# Patient Record
Sex: MALE | Race: WHITE | NOT HISPANIC OR LATINO | Employment: OTHER | URBAN - METROPOLITAN AREA
[De-identification: names, ages, dates, MRNs, and addresses within clinical notes are randomized per-mention and may not be internally consistent; named-entity substitution may affect disease eponyms.]

---

## 2018-01-23 ENCOUNTER — HOSPITAL ENCOUNTER (EMERGENCY)
Facility: CLINIC | Age: 63
Discharge: HOME | End: 2018-01-23
Attending: EMERGENCY MEDICINE | Admitting: EMERGENCY MEDICINE
Payer: COMMERCIAL

## 2018-01-23 ENCOUNTER — APPOINTMENT (OUTPATIENT)
Dept: ULTRASOUND IMAGING | Facility: CLINIC | Age: 63
End: 2018-01-23
Payer: COMMERCIAL

## 2018-01-23 VITALS
WEIGHT: 232.5 LBS | DIASTOLIC BLOOD PRESSURE: 93 MMHG | OXYGEN SATURATION: 98 % | HEART RATE: 61 BPM | RESPIRATION RATE: 16 BRPM | SYSTOLIC BLOOD PRESSURE: 161 MMHG | TEMPERATURE: 98.3 F

## 2018-01-23 DIAGNOSIS — M79.662 PAIN OF LEFT LOWER LEG: ICD-10-CM

## 2018-01-23 PROCEDURE — 99283 EMERGENCY DEPT VISIT LOW MDM: CPT | Mod: Z6 | Performed by: EMERGENCY MEDICINE

## 2018-01-23 PROCEDURE — 93971 EXTREMITY STUDY: CPT | Mod: LT

## 2018-01-23 PROCEDURE — 99284 EMERGENCY DEPT VISIT MOD MDM: CPT | Mod: 25

## 2018-01-23 RX ORDER — ATORVASTATIN CALCIUM 20 MG/1
20 TABLET, FILM COATED ORAL DAILY
COMMUNITY

## 2018-01-23 RX ORDER — LISINOPRIL 20 MG/1
20 TABLET ORAL DAILY
COMMUNITY

## 2018-01-23 ASSESSMENT — ENCOUNTER SYMPTOMS
PSYCHIATRIC NEGATIVE: 1
SHORTNESS OF BREATH: 0
MYALGIAS: 1
CHILLS: 0
NEUROLOGICAL NEGATIVE: 1
ARTHRALGIAS: 1
FEVER: 0
COLOR CHANGE: 0
WOUND: 0

## 2018-01-23 NOTE — DISCHARGE INSTRUCTIONS
Please make an appointment to follow up with Your Orthopedic Surgeon as soon as possible unless symptoms completely resolve.

## 2018-01-23 NOTE — ED NOTES
Pt initially noted a soft lump in the left popliteal fossa ~2 weeks ago. Now today he has also developed a firm mass in this same area.   He tells me that ~ 2 months ago he was performing some yard work and had some knee pads strapped on tightly on his knees. He thinks that perhaps this might have contributed to his current issues.  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  He denies any long car or air travel.  Pt denies any known injury to his left knee.  Current medications are antihypertensive and a statin.

## 2018-01-23 NOTE — ED NOTES
"Patient discovered a \"lump\" behind left knee this morning, reports he has had muscle pain to lateral left lower leg since changing his shoes to boots this winter.  Patient reports he was referred by his clinic to rule out a blood clot.  "

## 2018-01-23 NOTE — ED PROVIDER NOTES
"  History     Chief Complaint   Patient presents with     Leg Pain     Patient reports \"lump\" to left leg behind knee; called clinic and was referred to ED to rule out blood clot.  Patient denies recent travel.     HPI  Gio Jean is a 62 year old male who presents with chief complaint of a lump and pain behind his left knee.  Patient reports having had an aching pain in his left thigh and left lower calf for about 2 months.  Patient reports starting yesterday he noticed a big lump on the superior portion of his calf, and called his primary care physician who recommended he come to the emergency department to rule out a blood clot.  Patient has noted that he has felt a little bit of a lump in this leg before but it has never been this big.  Patient denies recent travel, recent surgery or any personal or family history of blood clots or bleeding disorders.  Patient denies chest pain or shortness of breath.  He denies swelling or erythema of the leg.    I have reviewed the Medications, Allergies, Past Medical and Surgical History, and Social History in the Epic system.    Review of Systems   Constitutional: Negative for chills and fever.   Respiratory: Negative for shortness of breath.    Cardiovascular: Negative for chest pain.   Musculoskeletal: Positive for arthralgias and myalgias.   Skin: Negative for color change, rash and wound.   Neurological: Negative.    Psychiatric/Behavioral: Negative.        Physical Exam   BP: (!) 161/93  Pulse: 61  Temp: 98.3  F (36.8  C)  Resp: 16  Weight: 105.5 kg (232 lb 8 oz)  SpO2: 98 %      Physical Exam   Constitutional: No distress.   HENT:   Head: Atraumatic.   Mouth/Throat: Oropharynx is clear and moist. No oropharyngeal exudate.   Eyes: Pupils are equal, round, and reactive to light. No scleral icterus.   Cardiovascular: Normal heart sounds and intact distal pulses.    Pulmonary/Chest: Breath sounds normal. No respiratory distress.   Abdominal: Soft. Bowel sounds are " normal. There is no tenderness.   Musculoskeletal: He exhibits no edema or tenderness.   [id number] lump left popliteal fossa, no lower extremity edema, no erythema   Skin: Skin is warm. No rash noted. He is not diaphoretic.       ED Course     ED Course     Procedures             Critical Care time:  none         Results for orders placed or performed during the hospital encounter of 01/23/18   US Lower Extremity Venous Duplex Left    Narrative    ULTRASOUND LEFT LOWER EXTREMITY VENOUS DUPLEX  1/23/2018 3:37 PM     HISTORY: Left leg swelling. Rule out DVT and Baker's cyst. Posterior  knee mass and pain.    FINDINGS: The deep veins in the left lower extremity are compressible  throughout. The deep veins demonstrate normal venous augmentation,  waveforms and color Doppler flow. No evidence of superficial  thrombophlebitis. Left popliteal fluid collection may represent a  Baker's cyst and measures 5.9 x 5.4 cm.      Impression    IMPRESSION:   1. No evidence of left lower extremity DVT.  2. Probable Baker's cyst in the popliteal fossa.    ROCHELLE LACKEY MD            Labs Ordered and Resulted from Time of ED Arrival Up to the Time of Departure from the ED - No data to display         Assessments & Plan (with Medical Decision Making)   Patient is a pleasant 62-year-old male who presents with chief complaint of a lump behind his left knee and pain.  Patient was instructed to go to the emergency department by his PCP to rule out a DVT.  Ultrasound revealed a palpable Baker's cyst, but no DVT.  Recommend patient take ibuprofen and ice the cyst for pain relief, and follow-up with orthopedics for further evaluation and treatment.    I have reviewed the nursing notes.    I have reviewed the findings, diagnosis, plan and need for follow up with the patient.    New Prescriptions    No medications on file       Final diagnoses:   Pain of left lower leg     Filpipo Wheeler D.O.  Family Medicine G1  [phone]  1/23/2018   [facility],  [facility] EMERGENCY DEPARTMENT  This data collected with the Resident working in the Emergency Department.  Patient was seen and evaluated by myself and I repeated the history and physical exam with the patient.  The plan of care was discussed with them.  The key portions of the note including the entire assessment and plan reflect my documentation.           Ariel Leung MD  01/23/18 [id number]

## 2018-01-23 NOTE — ED AVS SNAPSHOT
[facility], [place], Emergency Department    [address]    [place] [address]    Phone:  [phone]    Fax:  [phone]                                       Gio Jean   MRN: [id number]    Department:  [facility], Emergency Department   Date of Visit:  1/23/2018           After Visit Summary Signature Page     I have received my discharge instructions, and my questions have been answered. I have discussed any challenges I see with this plan with the nurse or doctor.    ..........................................................................................................................................  Patient/Patient Representative Signature      ..........................................................................................................................................  Patient Representative Print Name and Relationship to Patient    ..................................................               ................................................  Date                                            Time    ..........................................................................................................................................  Reviewed by Signature/Title    ...................................................              ..............................................  Date                                                            Time

## 2018-01-23 NOTE — ED AVS SNAPSHOT
[facility], Emergency Department    [address]    [place] [address]    Phone:  [phone]    Fax:  [phone]                                       Gio Jean   MRN: [id number]    Department:  [facility], Emergency Department   Date of Visit:  1/23/2018           Patient Information     Date Of Birth          1955        Your diagnoses for this visit were:     Pain of left lower leg        You were seen by Ariel Leung MD.        Discharge Instructions       Please make an appointment to follow up with Your Orthopedic Surgeon as soon as possible unless symptoms completely resolve.    24 Hour Appointment Hotline       To make an appointment at any [facility] clinic, call [phone] ([phone]). If you don't have a family doctor or clinic, we will help you find one. [facility] clinics are conveniently located to serve the needs of you and your family.             Review of your medicines      Our records show that you are taking the medicines listed below. If these are incorrect, please call your family doctor or clinic.        Dose / Directions Last dose taken    ATORVASTATIN CALCIUM PO   Dose:  20 mg        Take 20 mg by mouth daily   Refills:  0        LISINOPRIL PO   Dose:  20 mg        Take 20 mg by mouth daily   Refills:  0                Procedures and tests performed during your visit     US Lower Extremity Venous Duplex Left      Orders Needing Specimen Collection     None      Pending Results     No orders found from 1/21/2018 to 1/24/2018.            Pending Culture Results     No orders found from 1/21/2018 to 1/24/2018.            Pending Results Instructions     If you had any lab results that were not finalized at the time of your Discharge, you can call the ED Lab Result RN at [phone]. You will be contacted by this team for any positive Lab results or changes in treatment. The nurses are available 7 days a week from 10A to 6:30P.  You can leave a message 31  "hours per day and they will return your call.        Thank you for choosing [facility]       Thank you for choosing [facility] for your care. Our goal is always to provide you with excellent care. Hearing back from our patients is one way we can continue to improve our services. Please take a few minutes to complete the written survey that you may receive in the mail after you visit with us. Thank you!        [employer] Information     [employer] lets you send messages to your doctor, view your test results, renew your prescriptions, schedule appointments and more. To sign up, go to [email or web address] . Click on \"Log in\" on the left side of the screen, which will take you to the Welcome page. Then click on \"Sign up Now\" on the right side of the page.     You will be asked to enter the access code listed below, as well as some personal information. Please follow the directions to create your username and password.     Your access code is: [email or web address]  Expires: 2018  4:08 PM     Your access code will  in 90 days. If you need help or a new code, please call your [facility] clinic or [phone].        Care EveryWhere ID     This is your Care EveryWhere ID. This could be used by other organizations to access your [facility] medical records  [id number]        Equal Access to Services     ROBB MCDONALD : King Haney, waaxda luqadaha, qaybta kaalmada bettina, eleonora brewster. So [facility] [phone].    ATENCIÓN: Si habla español, tiene a bergeron disposición servicios gratuitos de asistencia lingüística. Llame al [phone].    We comply with applicable federal civil rights laws and [place] laws. We do not discriminate on the basis of race, color, national origin, age, disability, sex, sexual orientation, or gender identity.            After Visit Summary       This is your record. Keep this with you and show to your community pharmacist(s) and doctor(s) at your next visit.  "

## 2021-07-01 ENCOUNTER — HOSPITAL ENCOUNTER (EMERGENCY)
Facility: CLINIC | Age: 66
Discharge: HOME | End: 2021-07-01
Attending: EMERGENCY MEDICINE | Admitting: EMERGENCY MEDICINE
Payer: COMMERCIAL

## 2021-07-01 VITALS
RESPIRATION RATE: 18 BRPM | WEIGHT: 229.2 LBS | HEART RATE: 65 BPM | HEIGHT: 76 IN | TEMPERATURE: 97.1 F | SYSTOLIC BLOOD PRESSURE: 182 MMHG | BODY MASS INDEX: 27.91 KG/M2 | DIASTOLIC BLOOD PRESSURE: 104 MMHG | OXYGEN SATURATION: 99 %

## 2021-07-01 DIAGNOSIS — I10 ESSENTIAL HYPERTENSION: ICD-10-CM

## 2021-07-01 LAB
ANION GAP SERPL CALCULATED.3IONS-SCNC: 7 MMOL/L (ref 3–14)
BASOPHILS # BLD AUTO: 0.1 10E9/L (ref 0–0.2)
BASOPHILS NFR BLD AUTO: 1 %
BUN SERPL-MCNC: 11 MG/DL (ref 7–30)
CALCIUM SERPL-MCNC: 9.1 MG/DL (ref 8.5–10.1)
CHLORIDE SERPL-SCNC: 93 MMOL/L (ref 94–109)
CO2 SERPL-SCNC: 29 MMOL/L (ref 20–32)
CREAT SERPL-MCNC: 0.69 MG/DL (ref 0.66–1.25)
DIFFERENTIAL METHOD BLD: NORMAL
EOSINOPHIL # BLD AUTO: 0.3 10E9/L (ref 0–0.7)
EOSINOPHIL NFR BLD AUTO: 3.7 %
ERYTHROCYTE [DISTWIDTH] IN BLOOD BY AUTOMATED COUNT: 12.7 % (ref 10–15)
GFR SERPL CREATININE-BSD FRML MDRD: >90 ML/MIN/{1.73_M2}
GLUCOSE SERPL-MCNC: 99 MG/DL (ref 70–99)
HCT VFR BLD AUTO: 46.1 % (ref 40–53)
HGB BLD-MCNC: 16 G/DL (ref 13.3–17.7)
IMM GRANULOCYTES # BLD: 0 10E9/L (ref 0–0.4)
IMM GRANULOCYTES NFR BLD: 0.1 %
LYMPHOCYTES # BLD AUTO: 1.8 10E9/L (ref 0.8–5.3)
LYMPHOCYTES NFR BLD AUTO: 22.7 %
MCH RBC QN AUTO: 32.5 PG (ref 26.5–33)
MCHC RBC AUTO-ENTMCNC: 34.7 G/DL (ref 31.5–36.5)
MCV RBC AUTO: 94 FL (ref 78–100)
MONOCYTES # BLD AUTO: 1 10E9/L (ref 0–1.3)
MONOCYTES NFR BLD AUTO: 13 %
NEUTROPHILS # BLD AUTO: 4.6 10E9/L (ref 1.6–8.3)
NEUTROPHILS NFR BLD AUTO: 59.5 %
NRBC # BLD AUTO: 0 10*3/UL
NRBC BLD AUTO-RTO: 0 /100
PLATELET # BLD AUTO: 409 10E9/L (ref 150–450)
POTASSIUM SERPL-SCNC: 4.1 MMOL/L (ref 3.4–5.3)
RBC # BLD AUTO: 4.92 10E12/L (ref 4.4–5.9)
SODIUM SERPL-SCNC: 129 MMOL/L (ref 133–144)
WBC # BLD AUTO: 7.8 10E9/L (ref 4–11)

## 2021-07-01 PROCEDURE — 99283 EMERGENCY DEPT VISIT LOW MDM: CPT | Performed by: EMERGENCY MEDICINE

## 2021-07-01 PROCEDURE — 85025 COMPLETE CBC W/AUTO DIFF WBC: CPT | Performed by: EMERGENCY MEDICINE

## 2021-07-01 PROCEDURE — 80048 BASIC METABOLIC PNL TOTAL CA: CPT | Performed by: EMERGENCY MEDICINE

## 2021-07-01 RX ORDER — CLONIDINE HYDROCHLORIDE 0.1 MG/1
0.1 TABLET ORAL 2 TIMES DAILY
Qty: 1 TABLET | Refills: 0 | Status: SHIPPED | OUTPATIENT
Start: 2021-07-01 | End: 2024-06-19

## 2021-07-01 RX ORDER — LORATADINE 10 MG/1
10 TABLET ORAL DAILY
COMMUNITY
End: 2024-06-19

## 2021-07-01 ASSESSMENT — ENCOUNTER SYMPTOMS
FEVER: 0
DIARRHEA: 0
COUGH: 0
FATIGUE: 1
VOMITING: 0
SPEECH DIFFICULTY: 0
NUMBNESS: 0
NAUSEA: 0
HEADACHES: 1
WEAKNESS: 0
SHORTNESS OF BREATH: 0

## 2021-07-01 ASSESSMENT — MIFFLIN-ST. JEOR: SCORE: 1921.14

## 2021-07-01 NOTE — ED PROVIDER NOTES
ED Provider Note  [facility]      History     Chief Complaint   Patient presents with     Hypertension     pt reported that he feels odd around mid day today.      HPI  Gio Jean is a 66 year old male with history of essential hypertension who presents to the ED with complaint of high blood pressure today.  He states that this afternoon he felt a little pressure in the front of his forehead and thought he better check his blood pressure.  He states that it was in the 200/110s range, so felt that he better come in for evaluation.  He states that he is compliant with his lisinopril, has been on his current dose for about a year.  He states that he does not check his blood pressure really frequently, more so when he is feeling a little off.  He states that overall he has been feeling fine lately, but his significant other reports that he has reported feeling a little tired over the last few days.  No fever, cough, shortness of breath, nausea, vomiting, diarrhea.  No current pressure in his head.  No blurred vision or trouble speaking.  No numbness or weakness.  He states that he had been seen by primary care at one particular clinic that was near where he was working, though is not close to where he lives and is no longer convenient.  He states that he actually did make an appointment for Friday with a new PCP closer to his house.    This part of the medical record was transcribed by Krish Madison, Medical Scribe, from a dictation done by Yisel Acosta MD.       Past Medical History  Past Medical History:   Diagnosis Date     Hypercholesteremia      Hypertension      Inguinal hernia      Past Surgical History:   Procedure Laterality Date     APPENDECTOMY       HERNIA REPAIR      with mesh     SPLENECTOMY       ATORVASTATIN CALCIUM PO  cloNIDine (CATAPRES) 0.1 MG tablet  LISINOPRIL PO  loratadine (CLARITIN) 10 MG tablet      No Known Allergies  Family History  History reviewed.  "No pertinent family history.  Social History   Social History     Tobacco Use     Smoking status: Former Smoker     Smokeless tobacco: Never Used   Substance Use Topics     Alcohol use: Yes     Comment: 2-3 drinks/day     Drug use: No      Past medical history, past surgical history, medications, allergies, family history, and social history were reviewed with the patient. No additional pertinent items.       Review of Systems   Constitutional: Positive for fatigue. Negative for fever.   Eyes: Negative for visual disturbance.   Respiratory: Negative for cough and shortness of breath.    Gastrointestinal: Negative for diarrhea, nausea and vomiting.   Neurological: Positive for headaches (frontal pressure, resolved). Negative for speech difficulty, weakness and numbness.   All other systems reviewed and are negative.      Physical Exam   BP: (!) 192/102  Pulse: 69  Temp: 97.1  F (36.2  C)  Resp: 18  Height: 193 cm (6' 4\")  Weight: 104 kg (229 lb 3.2 oz)  SpO2: 99 %  Physical Exam  Constitutional:       General: He is not in acute distress.     Appearance: He is not diaphoretic.   HENT:      Head: Atraumatic.   Eyes:      General: No scleral icterus.     Pupils: Pupils are equal, round, and reactive to light.   Cardiovascular:      Heart sounds: Normal heart sounds.   Pulmonary:      Effort: No respiratory distress.      Breath sounds: Normal breath sounds.   Abdominal:      Palpations: Abdomen is soft.      Tenderness: There is no abdominal tenderness.   Musculoskeletal:         General: No tenderness.   Skin:     General: Skin is warm.      Findings: No rash.   Neurological:      General: No focal deficit present.      Mental Status: He is oriented to person, place, and time.      Cranial Nerves: No cranial nerve deficit.      Sensory: No sensory deficit.      Motor: No weakness.      Coordination: Coordination normal.         ED Course      Procedures               Results for orders placed or performed during the " hospital encounter of 07/01/21   CBC with platelets differential     Status: None   Result Value Ref Range    WBC 7.8 4.0 - 11.0 10e9/L    RBC Count 4.92 4.4 - 5.9 10e12/L    Hemoglobin 16.0 13.3 - 17.7 g/dL    Hematocrit 46.1 40.0 - 53.0 %    MCV 94 78 - 100 fl    MCH 32.5 26.5 - 33.0 pg    MCHC 34.7 31.5 - 36.5 g/dL    RDW 12.7 10.0 - 15.0 %    Platelet Count 409 150 - 450 10e9/L    Diff Method Automated Method     % Neutrophils 59.5 %    % Lymphocytes 22.7 %    % Monocytes 13.0 %    % Eosinophils 3.7 %    % Basophils 1.0 %    % Immature Granulocytes 0.1 %    Nucleated RBCs 0 0 /100    Absolute Neutrophil 4.6 1.6 - 8.3 10e9/L    Absolute Lymphocytes 1.8 0.8 - 5.3 10e9/L    Absolute Monocytes 1.0 0.0 - 1.3 10e9/L    Absolute Eosinophils 0.3 0.0 - 0.7 10e9/L    Absolute Basophils 0.1 0.0 - 0.2 10e9/L    Abs Immature Granulocytes 0.0 0 - 0.4 10e9/L    Absolute Nucleated RBC 0.0    Basic metabolic panel     Status: Abnormal   Result Value Ref Range    Sodium 129 (L) 133 - 144 mmol/L    Potassium 4.1 3.4 - 5.3 mmol/L    Chloride 93 (L) 94 - 109 mmol/L    Carbon Dioxide 29 20 - 32 mmol/L    Anion Gap 7 3 - 14 mmol/L    Glucose 99 70 - 99 mg/dL    Urea Nitrogen 11 7 - 30 mg/dL    Creatinine 0.69 0.66 - 1.25 mg/dL    GFR Estimate >90 >60 mL/min/[1.73_m2]    GFR Estimate If Black >90 >60 mL/min/[1.73_m2]    Calcium 9.1 8.5 - 10.1 mg/dL     Medications - No data to display     Assessments & Plan (with Medical Decision Making)   I did check some basic labs, CBC was unremarkable, BMP revealed mildly low sodium at 129, mildly low chloride at 93.  The patient reports that he has been told that his sodium was low in the past as well.  He states he does drink a lot of water.  Blood pressures were initially high here, though without treatment did slowly come down.  He did have several readings in the 160s over 90s.  Given this I did not feel he needed any emergent treatment of his blood pressure here.  He will likely need  medication adjustment long-term.  He is encouraged to check his blood pressure twice daily and follow-up with his primary care provider on Friday, tomorrow, as planned.  In the interim I will give him a prescription for 1 tablet of clonidine to be used as needed only if his blood pressure is above 200 systolic or 105 diastolic.  He should continue his lisinopril in the meantime.  He is feeling at his baseline at this point, I do think he is safe for discharge home.  He may return with any concerns.    Dictation Disclaimer: Some of this Note has been completed with voice-recognition dictation software. Although errors are generally corrected real-time, there is the potential for a rare error to be present in the completed chart.      I have reviewed the nursing notes. I have reviewed the findings, diagnosis, plan and need for follow up with the patient.    Discharge Medication List as of 7/1/2021  3:05 AM      START taking these medications    Details   cloNIDine (CATAPRES) 0.1 MG tablet Take 1 tablet (0.1 mg) by mouth 2 times daily, Disp-1 tablet, R-0, Local Print             Final diagnoses:   Essential hypertension       --  Yisel Acosta MD  [facility] EMERGENCY DEPARTMENT  7/1/2021     Yisel Acosta MD  07/01/21 0601

## 2021-07-01 NOTE — DISCHARGE INSTRUCTIONS
Take your Lisinopril in the morning, as usual. Take your blood pressure twice daily and record the numbers - take them with your to your appointment. If you upper number is above 200 or your lower number is above 105, recheck again in 5 minutes. You can take the dose of the new medication if your systolic (top number) is over 200 or your lower number is above 105 (and upper number also above 175) on 3 successive readings 5 minutes apart. Return to the ER with any concerns. Follow up on Friday as planned.

## 2024-06-18 ENCOUNTER — APPOINTMENT (OUTPATIENT)
Dept: GENERAL RADIOLOGY | Facility: CLINIC | Age: 69
End: 2024-06-18
Attending: EMERGENCY MEDICINE
Payer: COMMERCIAL

## 2024-06-18 ENCOUNTER — APPOINTMENT (OUTPATIENT)
Dept: CT IMAGING | Facility: CLINIC | Age: 69
End: 2024-06-18
Attending: EMERGENCY MEDICINE
Payer: COMMERCIAL

## 2024-06-18 ENCOUNTER — HOSPITAL ENCOUNTER (OUTPATIENT)
Facility: CLINIC | Age: 69
Setting detail: OBSERVATION
Discharge: HOME | End: 2024-06-19
Attending: EMERGENCY MEDICINE | Admitting: INTERNAL MEDICINE
Payer: COMMERCIAL

## 2024-06-18 DIAGNOSIS — R00.1 BRADYCARDIA: ICD-10-CM

## 2024-06-18 DIAGNOSIS — E87.1 HYPONATREMIA: ICD-10-CM

## 2024-06-18 DIAGNOSIS — R11.2 NAUSEA AND VOMITING, UNSPECIFIED VOMITING TYPE: ICD-10-CM

## 2024-06-18 DIAGNOSIS — R42 DIZZINESS: Primary | ICD-10-CM

## 2024-06-18 LAB
ALBUMIN SERPL BCG-MCNC: 4 G/DL (ref 3.5–5.2)
ALBUMIN UR-MCNC: 30 MG/DL
ALP SERPL-CCNC: 85 U/L (ref 40–150)
ALT SERPL W P-5'-P-CCNC: 25 U/L (ref 0–70)
ANION GAP SERPL CALCULATED.3IONS-SCNC: 11 MMOL/L (ref 7–15)
APPEARANCE UR: CLEAR
AST SERPL W P-5'-P-CCNC: 29 U/L (ref 0–45)
BASOPHILS # BLD AUTO: ABNORMAL 10*3/UL
BASOPHILS # BLD MANUAL: 0 10E3/UL (ref 0–0.2)
BASOPHILS NFR BLD AUTO: ABNORMAL %
BASOPHILS NFR BLD MANUAL: 0 %
BILIRUB SERPL-MCNC: 0.6 MG/DL
BILIRUB UR QL STRIP: NEGATIVE
BUN SERPL-MCNC: 12 MG/DL (ref 8–23)
CALCIUM SERPL-MCNC: 8.8 MG/DL (ref 8.8–10.2)
CHLORIDE SERPL-SCNC: 92 MMOL/L (ref 98–107)
COLOR UR AUTO: YELLOW
CREAT SERPL-MCNC: 0.69 MG/DL (ref 0.67–1.17)
DEPRECATED HCO3 PLAS-SCNC: 22 MMOL/L (ref 22–29)
EGFRCR SERPLBLD CKD-EPI 2021: >90 ML/MIN/1.73M2
EOSINOPHIL # BLD AUTO: ABNORMAL 10*3/UL
EOSINOPHIL # BLD MANUAL: 0 10E3/UL (ref 0–0.7)
EOSINOPHIL NFR BLD AUTO: ABNORMAL %
EOSINOPHIL NFR BLD MANUAL: 0 %
ERYTHROCYTE [DISTWIDTH] IN BLOOD BY AUTOMATED COUNT: 13.3 % (ref 10–15)
FLUAV RNA SPEC QL NAA+PROBE: NEGATIVE
FLUBV RNA RESP QL NAA+PROBE: NEGATIVE
GLUCOSE SERPL-MCNC: 129 MG/DL (ref 70–99)
GLUCOSE UR STRIP-MCNC: NEGATIVE MG/DL
HCT VFR BLD AUTO: 41.1 % (ref 40–53)
HGB BLD-MCNC: 14.2 G/DL (ref 13.3–17.7)
HGB UR QL STRIP: NEGATIVE
IMM GRANULOCYTES # BLD: ABNORMAL 10*3/UL
IMM GRANULOCYTES NFR BLD: ABNORMAL %
KETONES UR STRIP-MCNC: 100 MG/DL
LEUKOCYTE ESTERASE UR QL STRIP: NEGATIVE
LYMPHOCYTES # BLD AUTO: ABNORMAL 10*3/UL
LYMPHOCYTES # BLD MANUAL: 0.1 10E3/UL (ref 0.8–5.3)
LYMPHOCYTES NFR BLD AUTO: ABNORMAL %
LYMPHOCYTES NFR BLD MANUAL: 1 %
MAGNESIUM SERPL-MCNC: 1.9 MG/DL (ref 1.7–2.3)
MCH RBC QN AUTO: 31.2 PG (ref 26.5–33)
MCHC RBC AUTO-ENTMCNC: 34.5 G/DL (ref 31.5–36.5)
MCV RBC AUTO: 90 FL (ref 78–100)
MONOCYTES # BLD AUTO: ABNORMAL 10*3/UL
MONOCYTES # BLD MANUAL: 0.7 10E3/UL (ref 0–1.3)
MONOCYTES NFR BLD AUTO: ABNORMAL %
MONOCYTES NFR BLD MANUAL: 6 %
MUCOUS THREADS #/AREA URNS LPF: PRESENT /LPF
NEUTROPHILS # BLD AUTO: ABNORMAL 10*3/UL
NEUTROPHILS # BLD MANUAL: 11.2 10E3/UL (ref 1.6–8.3)
NEUTROPHILS NFR BLD AUTO: ABNORMAL %
NEUTROPHILS NFR BLD MANUAL: 93 %
NITRATE UR QL: NEGATIVE
NRBC # BLD AUTO: 0 10E3/UL
NRBC BLD AUTO-RTO: 0 /100
PH UR STRIP: 6.5 [PH] (ref 5–7)
PLAT MORPH BLD: ABNORMAL
PLATELET # BLD AUTO: 323 10E3/UL (ref 150–450)
POTASSIUM SERPL-SCNC: 4.1 MMOL/L (ref 3.4–5.3)
PROT SERPL-MCNC: 7.3 G/DL (ref 6.4–8.3)
RBC # BLD AUTO: 4.55 10E6/UL (ref 4.4–5.9)
RBC MORPH BLD: ABNORMAL
RBC URINE: 1 /HPF
RSV RNA SPEC NAA+PROBE: NEGATIVE
SARS-COV-2 RNA RESP QL NAA+PROBE: NEGATIVE
SODIUM SERPL-SCNC: 125 MMOL/L (ref 135–145)
SP GR UR STRIP: 1.02 (ref 1–1.03)
TROPONIN T SERPL HS-MCNC: 10 NG/L
TSH SERPL DL<=0.005 MIU/L-ACNC: 0.84 UIU/ML (ref 0.3–4.2)
UROBILINOGEN UR STRIP-MCNC: NORMAL MG/DL
WBC # BLD AUTO: 12 10E3/UL (ref 4–11)
WBC URINE: 2 /HPF

## 2024-06-18 PROCEDURE — 250N000013 HC RX MED GY IP 250 OP 250 PS 637: Performed by: EMERGENCY MEDICINE

## 2024-06-18 PROCEDURE — 70450 CT HEAD/BRAIN W/O DYE: CPT | Mod: 26 | Performed by: RADIOLOGY

## 2024-06-18 PROCEDURE — 80053 COMPREHEN METABOLIC PANEL: CPT | Performed by: EMERGENCY MEDICINE

## 2024-06-18 PROCEDURE — 85007 BL SMEAR W/DIFF WBC COUNT: CPT | Performed by: EMERGENCY MEDICINE

## 2024-06-18 PROCEDURE — 71046 X-RAY EXAM CHEST 2 VIEWS: CPT

## 2024-06-18 PROCEDURE — 84443 ASSAY THYROID STIM HORMONE: CPT | Performed by: EMERGENCY MEDICINE

## 2024-06-18 PROCEDURE — 250N000011 HC RX IP 250 OP 636: Performed by: EMERGENCY MEDICINE

## 2024-06-18 PROCEDURE — 84300 ASSAY OF URINE SODIUM: CPT

## 2024-06-18 PROCEDURE — 250N000009 HC RX 250: Performed by: EMERGENCY MEDICINE

## 2024-06-18 PROCEDURE — 81001 URINALYSIS AUTO W/SCOPE: CPT | Performed by: EMERGENCY MEDICINE

## 2024-06-18 PROCEDURE — 83735 ASSAY OF MAGNESIUM: CPT | Performed by: EMERGENCY MEDICINE

## 2024-06-18 PROCEDURE — 93005 ELECTROCARDIOGRAM TRACING: CPT | Performed by: EMERGENCY MEDICINE

## 2024-06-18 PROCEDURE — 84484 ASSAY OF TROPONIN QUANT: CPT | Performed by: EMERGENCY MEDICINE

## 2024-06-18 PROCEDURE — 258N000003 HC RX IP 258 OP 636: Mod: JZ | Performed by: EMERGENCY MEDICINE

## 2024-06-18 PROCEDURE — 71046 X-RAY EXAM CHEST 2 VIEWS: CPT | Mod: 26 | Performed by: RADIOLOGY

## 2024-06-18 PROCEDURE — 85027 COMPLETE CBC AUTOMATED: CPT | Performed by: EMERGENCY MEDICINE

## 2024-06-18 PROCEDURE — 74177 CT ABD & PELVIS W/CONTRAST: CPT | Mod: 26 | Performed by: RADIOLOGY

## 2024-06-18 PROCEDURE — 99285 EMERGENCY DEPT VISIT HI MDM: CPT | Mod: 25 | Performed by: EMERGENCY MEDICINE

## 2024-06-18 PROCEDURE — 87637 SARSCOV2&INF A&B&RSV AMP PRB: CPT | Performed by: EMERGENCY MEDICINE

## 2024-06-18 PROCEDURE — 96361 HYDRATE IV INFUSION ADD-ON: CPT | Performed by: EMERGENCY MEDICINE

## 2024-06-18 PROCEDURE — 83935 ASSAY OF URINE OSMOLALITY: CPT

## 2024-06-18 PROCEDURE — 93010 ELECTROCARDIOGRAM REPORT: CPT | Performed by: EMERGENCY MEDICINE

## 2024-06-18 PROCEDURE — 70450 CT HEAD/BRAIN W/O DYE: CPT

## 2024-06-18 PROCEDURE — 36415 COLL VENOUS BLD VENIPUNCTURE: CPT | Performed by: EMERGENCY MEDICINE

## 2024-06-18 PROCEDURE — 99285 EMERGENCY DEPT VISIT HI MDM: CPT | Performed by: EMERGENCY MEDICINE

## 2024-06-18 PROCEDURE — 74177 CT ABD & PELVIS W/CONTRAST: CPT

## 2024-06-18 PROCEDURE — 96360 HYDRATION IV INFUSION INIT: CPT | Mod: 59 | Performed by: EMERGENCY MEDICINE

## 2024-06-18 RX ORDER — MECLIZINE HYDROCHLORIDE 25 MG/1
25 TABLET ORAL ONCE
Status: DISCONTINUED | OUTPATIENT
Start: 2024-06-18 | End: 2024-06-18

## 2024-06-18 RX ORDER — IOPAMIDOL 755 MG/ML
135 INJECTION, SOLUTION INTRAVASCULAR ONCE
Status: DISCONTINUED | OUTPATIENT
Start: 2024-06-18 | End: 2024-06-18

## 2024-06-18 RX ORDER — ONDANSETRON 2 MG/ML
4 INJECTION INTRAMUSCULAR; INTRAVENOUS EVERY 30 MIN PRN
Status: DISCONTINUED | OUTPATIENT
Start: 2024-06-18 | End: 2024-06-19

## 2024-06-18 RX ADMIN — SODIUM CHLORIDE, PRESERVATIVE FREE 84 ML: 5 INJECTION INTRAVENOUS at 23:40

## 2024-06-18 RX ADMIN — MECLIZINE HYDROCHLORIDE 25 MG: 25 TABLET ORAL at 21:05

## 2024-06-18 RX ADMIN — IOPAMIDOL 135 ML: 755 INJECTION, SOLUTION INTRAVENOUS at 23:41

## 2024-06-18 RX ADMIN — SODIUM CHLORIDE 1000 ML: 9 INJECTION, SOLUTION INTRAVENOUS at 21:04

## 2024-06-18 ASSESSMENT — ACTIVITIES OF DAILY LIVING (ADL)
ADLS_ACUITY_SCORE: 33
ADLS_ACUITY_SCORE: 35

## 2024-06-18 ASSESSMENT — COLUMBIA-SUICIDE SEVERITY RATING SCALE - C-SSRS
2. HAVE YOU ACTUALLY HAD ANY THOUGHTS OF KILLING YOURSELF IN THE PAST MONTH?: NO
1. IN THE PAST MONTH, HAVE YOU WISHED YOU WERE DEAD OR WISHED YOU COULD GO TO SLEEP AND NOT WAKE UP?: NO
6. HAVE YOU EVER DONE ANYTHING, STARTED TO DO ANYTHING, OR PREPARED TO DO ANYTHING TO END YOUR LIFE?: NO

## 2024-06-19 ENCOUNTER — APPOINTMENT (OUTPATIENT)
Dept: CARDIOLOGY | Facility: CLINIC | Age: 69
End: 2024-06-19
Attending: INTERNAL MEDICINE
Payer: COMMERCIAL

## 2024-06-19 VITALS
DIASTOLIC BLOOD PRESSURE: 76 MMHG | HEART RATE: 61 BPM | SYSTOLIC BLOOD PRESSURE: 144 MMHG | TEMPERATURE: 98 F | OXYGEN SATURATION: 96 % | RESPIRATION RATE: 16 BRPM

## 2024-06-19 PROBLEM — E87.1 HYPONATREMIA: Status: ACTIVE | Noted: 2024-06-19

## 2024-06-19 LAB
ANION GAP SERPL CALCULATED.3IONS-SCNC: 10 MMOL/L (ref 7–15)
ANION GAP SERPL CALCULATED.3IONS-SCNC: 11 MMOL/L (ref 7–15)
ATRIAL RATE - MUSE: 59 BPM
BASOPHILS # BLD AUTO: 0 10E3/UL (ref 0–0.2)
BASOPHILS NFR BLD AUTO: 0 %
BUN SERPL-MCNC: 12 MG/DL (ref 8–23)
BUN SERPL-MCNC: 9.1 MG/DL (ref 8–23)
CALCIUM SERPL-MCNC: 8.8 MG/DL (ref 8.8–10.2)
CALCIUM SERPL-MCNC: 9.2 MG/DL (ref 8.8–10.2)
CHLORIDE SERPL-SCNC: 92 MMOL/L (ref 98–107)
CHLORIDE SERPL-SCNC: 93 MMOL/L (ref 98–107)
CREAT SERPL-MCNC: 0.69 MG/DL (ref 0.67–1.17)
CREAT SERPL-MCNC: 0.76 MG/DL (ref 0.67–1.17)
DEPRECATED HCO3 PLAS-SCNC: 22 MMOL/L (ref 22–29)
DEPRECATED HCO3 PLAS-SCNC: 24 MMOL/L (ref 22–29)
DIASTOLIC BLOOD PRESSURE - MUSE: NORMAL MMHG
EGFRCR SERPLBLD CKD-EPI 2021: >90 ML/MIN/1.73M2
EGFRCR SERPLBLD CKD-EPI 2021: >90 ML/MIN/1.73M2
EOSINOPHIL # BLD AUTO: 0 10E3/UL (ref 0–0.7)
EOSINOPHIL NFR BLD AUTO: 0 %
ERYTHROCYTE [DISTWIDTH] IN BLOOD BY AUTOMATED COUNT: 13.3 % (ref 10–15)
GLUCOSE SERPL-MCNC: 111 MG/DL (ref 70–99)
GLUCOSE SERPL-MCNC: 129 MG/DL (ref 70–99)
HCT VFR BLD AUTO: 41.9 % (ref 40–53)
HGB BLD-MCNC: 14.8 G/DL (ref 13.3–17.7)
IMM GRANULOCYTES # BLD: 0 10E3/UL
IMM GRANULOCYTES NFR BLD: 0 %
INTERPRETATION ECG - MUSE: NORMAL
LVEF ECHO: NORMAL
LYMPHOCYTES # BLD AUTO: 1.6 10E3/UL (ref 0.8–5.3)
LYMPHOCYTES NFR BLD AUTO: 15 %
MCH RBC QN AUTO: 31.7 PG (ref 26.5–33)
MCHC RBC AUTO-ENTMCNC: 35.3 G/DL (ref 31.5–36.5)
MCV RBC AUTO: 90 FL (ref 78–100)
MONOCYTES # BLD AUTO: 1.3 10E3/UL (ref 0–1.3)
MONOCYTES NFR BLD AUTO: 12 %
NEUTROPHILS # BLD AUTO: 7.9 10E3/UL (ref 1.6–8.3)
NEUTROPHILS NFR BLD AUTO: 73 %
NRBC # BLD AUTO: 0 10E3/UL
NRBC BLD AUTO-RTO: 0 /100
OSMOLALITY SERPL: 272 MMOL/KG (ref 280–301)
OSMOLALITY UR: 720 MMOL/KG (ref 100–1200)
P AXIS - MUSE: 40 DEGREES
PLATELET # BLD AUTO: 303 10E3/UL (ref 150–450)
POTASSIUM SERPL-SCNC: 4.1 MMOL/L (ref 3.4–5.3)
POTASSIUM SERPL-SCNC: 4.5 MMOL/L (ref 3.4–5.3)
PR INTERVAL - MUSE: 176 MS
QRS DURATION - MUSE: 104 MS
QT - MUSE: 418 MS
QTC - MUSE: 413 MS
R AXIS - MUSE: -19 DEGREES
RBC # BLD AUTO: 4.67 10E6/UL (ref 4.4–5.9)
SODIUM SERPL-SCNC: 125 MMOL/L (ref 135–145)
SODIUM SERPL-SCNC: 127 MMOL/L (ref 135–145)
SODIUM SERPL-SCNC: 129 MMOL/L (ref 135–145)
SODIUM SERPL-SCNC: 129 MMOL/L (ref 135–145)
SODIUM UR-SCNC: <20 MMOL/L
SYSTOLIC BLOOD PRESSURE - MUSE: NORMAL MMHG
T AXIS - MUSE: 24 DEGREES
VENTRICULAR RATE- MUSE: 59 BPM
WBC # BLD AUTO: 10.9 10E3/UL (ref 4–11)

## 2024-06-19 PROCEDURE — 99236 HOSP IP/OBS SAME DATE HI 85: CPT | Mod: GC | Performed by: INTERNAL MEDICINE

## 2024-06-19 PROCEDURE — 96361 HYDRATE IV INFUSION ADD-ON: CPT

## 2024-06-19 PROCEDURE — 258N000003 HC RX IP 258 OP 636: Mod: JZ

## 2024-06-19 PROCEDURE — 93306 TTE W/DOPPLER COMPLETE: CPT

## 2024-06-19 PROCEDURE — 36415 COLL VENOUS BLD VENIPUNCTURE: CPT

## 2024-06-19 PROCEDURE — 93306 TTE W/DOPPLER COMPLETE: CPT | Mod: 26 | Performed by: INTERNAL MEDICINE

## 2024-06-19 PROCEDURE — 250N000013 HC RX MED GY IP 250 OP 250 PS 637

## 2024-06-19 PROCEDURE — 258N000003 HC RX IP 258 OP 636: Mod: JZ | Performed by: INTERNAL MEDICINE

## 2024-06-19 PROCEDURE — 80048 BASIC METABOLIC PNL TOTAL CA: CPT

## 2024-06-19 PROCEDURE — G0378 HOSPITAL OBSERVATION PER HR: HCPCS

## 2024-06-19 PROCEDURE — 99207 PR APP CREDIT; MD BILLING SHARED VISIT: CPT | Performed by: INTERNAL MEDICINE

## 2024-06-19 PROCEDURE — 36415 COLL VENOUS BLD VENIPUNCTURE: CPT | Performed by: INTERNAL MEDICINE

## 2024-06-19 PROCEDURE — 84295 ASSAY OF SERUM SODIUM: CPT | Performed by: INTERNAL MEDICINE

## 2024-06-19 PROCEDURE — 83930 ASSAY OF BLOOD OSMOLALITY: CPT

## 2024-06-19 PROCEDURE — 85025 COMPLETE CBC W/AUTO DIFF WBC: CPT

## 2024-06-19 RX ORDER — ATORVASTATIN CALCIUM 10 MG/1
20 TABLET, FILM COATED ORAL DAILY
Status: DISCONTINUED | OUTPATIENT
Start: 2024-06-19 | End: 2024-06-19 | Stop reason: HOSPADM

## 2024-06-19 RX ORDER — AMOXICILLIN 250 MG
2 CAPSULE ORAL 2 TIMES DAILY PRN
Status: DISCONTINUED | OUTPATIENT
Start: 2024-06-19 | End: 2024-06-19 | Stop reason: HOSPADM

## 2024-06-19 RX ORDER — SODIUM CHLORIDE, SODIUM LACTATE, POTASSIUM CHLORIDE, CALCIUM CHLORIDE 600; 310; 30; 20 MG/100ML; MG/100ML; MG/100ML; MG/100ML
INJECTION, SOLUTION INTRAVENOUS CONTINUOUS
Status: DISCONTINUED | OUTPATIENT
Start: 2024-06-19 | End: 2024-06-19

## 2024-06-19 RX ORDER — AMOXICILLIN 250 MG
1 CAPSULE ORAL 2 TIMES DAILY PRN
Status: DISCONTINUED | OUTPATIENT
Start: 2024-06-19 | End: 2024-06-19 | Stop reason: HOSPADM

## 2024-06-19 RX ORDER — ONDANSETRON 4 MG/1
4 TABLET, ORALLY DISINTEGRATING ORAL EVERY 6 HOURS PRN
Status: DISCONTINUED | OUTPATIENT
Start: 2024-06-19 | End: 2024-06-19 | Stop reason: HOSPADM

## 2024-06-19 RX ORDER — LISINOPRIL 40 MG/1
40 TABLET ORAL DAILY
Status: DISCONTINUED | OUTPATIENT
Start: 2024-06-19 | End: 2024-06-19 | Stop reason: HOSPADM

## 2024-06-19 RX ORDER — ACETAMINOPHEN 650 MG/1
650 SUPPOSITORY RECTAL EVERY 4 HOURS PRN
Status: DISCONTINUED | OUTPATIENT
Start: 2024-06-19 | End: 2024-06-19 | Stop reason: HOSPADM

## 2024-06-19 RX ORDER — ONDANSETRON 2 MG/ML
4 INJECTION INTRAMUSCULAR; INTRAVENOUS EVERY 6 HOURS PRN
Status: DISCONTINUED | OUTPATIENT
Start: 2024-06-19 | End: 2024-06-19 | Stop reason: HOSPADM

## 2024-06-19 RX ORDER — BENZONATATE 100 MG/1
100 CAPSULE ORAL 3 TIMES DAILY PRN
Status: DISCONTINUED | OUTPATIENT
Start: 2024-06-19 | End: 2024-06-19 | Stop reason: HOSPADM

## 2024-06-19 RX ORDER — ACETAMINOPHEN 325 MG/1
650 TABLET ORAL EVERY 4 HOURS PRN
Status: DISCONTINUED | OUTPATIENT
Start: 2024-06-19 | End: 2024-06-19 | Stop reason: HOSPADM

## 2024-06-19 RX ADMIN — ATORVASTATIN CALCIUM 20 MG: 20 TABLET, FILM COATED ORAL at 07:41

## 2024-06-19 RX ADMIN — SODIUM CHLORIDE, POTASSIUM CHLORIDE, SODIUM LACTATE AND CALCIUM CHLORIDE: 600; 310; 30; 20 INJECTION, SOLUTION INTRAVENOUS at 04:13

## 2024-06-19 ASSESSMENT — ACTIVITIES OF DAILY LIVING (ADL)
ADLS_ACUITY_SCORE: 35
ADLS_ACUITY_SCORE: 20
ADLS_ACUITY_SCORE: 20
ADLS_ACUITY_SCORE: 35
ADLS_ACUITY_SCORE: 35
ADLS_ACUITY_SCORE: 20
ADLS_ACUITY_SCORE: 35
ADLS_ACUITY_SCORE: 20
ADLS_ACUITY_SCORE: 20
ADLS_ACUITY_SCORE: 35

## 2024-06-19 NOTE — ED PROVIDER NOTES
History     Chief Complaint   Patient presents with    Dizziness    Flu Symptoms     HPI  Gio Jean is a 69 year old male with a past medical history of hyperlipidemia, hypertension, inguinal hernia, splenectomy and appendectomy who presents to the emergency department with a chief complaint of dizziness.  The patient states for the past 3 days he has been having nasal drainage and sinus pressure.  He reports that he feels dehydrated, dizzy, and nauseated today.  The patient states that this afternoon he had an episode where he passed out and lost control of his bladder.  He is here for further evaluation.  No seizure history.    Additional history was obtained from the patient's wife who notes that when the patient lost consciousness, he was staring into space and she is concerned about the possibility of seizure.  No generalized tonic-clonic activity.    I have reviewed the Medications, Allergies, Past Medical and Surgical History, and Social History in the [employer] system.    Past Medical History:   Diagnosis Date    Hypercholesteremia     Hypertension     Inguinal hernia      Past Surgical History:   Procedure Laterality Date    APPENDECTOMY      HERNIA REPAIR      with mesh    SPLENECTOMY       No current facility-administered medications for this encounter.     Current Outpatient Medications   Medication Sig Dispense Refill    ATORVASTATIN CALCIUM PO Take 20 mg by mouth daily      cloNIDine (CATAPRES) 0.1 MG tablet Take 1 tablet (0.1 mg) by mouth 2 times daily 1 tablet 0    LISINOPRIL PO Take 40 mg by mouth daily       loratadine (CLARITIN) 10 MG tablet Take 10 mg by mouth daily       Allergies   Allergen Reactions    Sulfa Antibiotics Hives     Past medical history, past surgical history, medications, and allergies were reviewed with the patient. Additional pertinent items: None    Social History     Socioeconomic History    Marital status:      Spouse name: Not on file    Number of children:  Not on file    Years of education: Not on file    Highest education level: Not on file   Occupational History    Not on file   Tobacco Use    Smoking status: Former    Smokeless tobacco: Never   Substance and Sexual Activity    Alcohol use: Yes     Comment: 2-3 drinks/day    Drug use: No    Sexual activity: Not on file   Other Topics Concern    Not on file   Social History Narrative    Not on file     Social Determinants of Health     Financial Resource Strain: Not At Risk (10/1/2023)    Received from [facility]    Financial Resource Strain     Is it hard for you to pay for the very basics like food, housing, medical care or heating?: No   Food Insecurity: Not At Risk (10/1/2023)    Received from [facility]    Food Insecurity     Does your food run out before you have the money to buy more?: No   Transportation Needs: Not At Risk (10/1/2023)    Received from [facility]    Transportation Needs     Does a lack of transportation keep you from your medical appointments or from getting your medications?: No   Physical Activity: Not on file   Stress: Not on file   Social Connections: Not on file   Interpersonal Safety: Not on file   Housing Stability: Not on file     Social history was reviewed with the patient. Additional pertinent items: None    Review of Systems  A medically appropriate review of systems was performed with pertinent positives and negatives noted in the HPI, and all other systems negative.    Physical Exam   BP: 114/68  Pulse: 68  Temp: 98.3  F (36.8  C)  Resp: 15  SpO2: 97 %      General: Well nourished, well developed, NAD  HEENT: EOMI, anicteric. NCAT, MMM  Neck: no jugular venous distension, supple, nl ROM  Cardiac: Regular rate and rhythm. No murmurs, rubs, or gallops. Normal S1, S2.  Intact peripheral pulses  Pulm: CTAB, no stridor, wheezes, rales, rhonchi  Abd: Soft, nontender, nondistended.  No masses palpated.    Skin: Warm and dry to the touch.  No rash  Extremities: No LE edema,  no cyanosis, w/w/p  Neuro: Alert and oriented x 4, no facial droop, CN II-XII intact, strength 5/5 all 4 extremities, sensation intact throughout, no nystagmus, coordination normal as tested. No skew. PERRL, EOMI.  Speech is clear without aphasia or dysarthria.      ED Course        Procedures                    EKG Interpretation:      Interpreted by Columba Lauren MD  Time reviewed: 2048  Symptoms at time of EKG: dizziness   Rhythm:  sinus bradycardia  Rate: bradycardic, 59 bpm  Axis: normal  Ectopy: none  Conduction: normal  ST Segments/ T Waves: No ST-T wave changes  Q Waves: none  Comparison to prior: No old EKG available    Clinical Impression: abnormal EKG d/t rate            Labs Ordered and Resulted from Time of ED Arrival to Time of ED Departure   COMPREHENSIVE METABOLIC PANEL - Abnormal       Result Value    Sodium 125 (*)     Potassium 4.1      Carbon Dioxide (CO2) 22      Anion Gap 11      Urea Nitrogen 12.0      Creatinine 0.69      GFR Estimate >90      Calcium 8.8      Chloride 92 (*)     Glucose 129 (*)     Alkaline Phosphatase 85      AST 29      ALT 25      Protein Total 7.3      Albumin 4.0      Bilirubin Total 0.6     ROUTINE UA WITH MICROSCOPIC REFLEX TO CULTURE - Abnormal    Color Urine Yellow      Appearance Urine Clear      Glucose Urine Negative      Bilirubin Urine Negative      Ketones Urine 100 (*)     Specific Gravity Urine 1.024      Blood Urine Negative      pH Urine 6.5      Protein Albumin Urine 30 (*)     Urobilinogen Urine Normal      Nitrite Urine Negative      Leukocyte Esterase Urine Negative      Mucus Urine Present (*)     RBC Urine 1      WBC Urine 2     CBC WITH PLATELETS AND DIFFERENTIAL - Abnormal    WBC Count 12.0 (*)     RBC Count 4.55      Hemoglobin 14.2      Hematocrit 41.1      MCV 90      MCH 31.2      MCHC 34.5      RDW 13.3      Platelet Count 323      % Neutrophils        % Lymphocytes        % Monocytes        % Eosinophils        % Basophils        %  Immature Granulocytes        NRBCs per 100 WBC 0      Absolute Neutrophils        Absolute Lymphocytes        Absolute Monocytes        Absolute Eosinophils        Absolute Basophils        Absolute Immature Granulocytes        Absolute NRBCs 0.0     DIFFERENTIAL - Abnormal    % Neutrophils 93      % Lymphocytes 1      % Monocytes 6      % Eosinophils 0      % Basophils 0      Absolute Neutrophils 11.2 (*)     Absolute Lymphocytes 0.1 (*)     Absolute Monocytes 0.7      Absolute Eosinophils 0.0      Absolute Basophils 0.0      RBC Morphology Confirmed RBC Indices      Platelet Assessment        Value: Automated Count Confirmed. Platelet morphology is normal.   OSMOLALITY - Abnormal    Osmolality Blood 272 (*)    BASIC METABOLIC PANEL - Abnormal    Sodium 125 (*)     Potassium 4.1      Chloride 92 (*)     Carbon Dioxide (CO2) 22      Anion Gap 11      Urea Nitrogen 12.0      Creatinine 0.69      GFR Estimate >90      Calcium 8.8      Glucose 129 (*)    BASIC METABOLIC PANEL - Abnormal    Sodium 127 (*)     Potassium 4.5      Chloride 93 (*)     Carbon Dioxide (CO2) 24      Anion Gap 10      Urea Nitrogen 9.1      Creatinine 0.76      GFR Estimate >90      Calcium 9.2      Glucose 111 (*)    MAGNESIUM - Normal    Magnesium 1.9     INFLUENZA A/B, RSV, & SARS-COV2 PCR - Normal    Influenza A PCR Negative      Influenza B PCR Negative      RSV PCR Negative      SARS CoV2 PCR Negative     TROPONIN T, HIGH SENSITIVITY - Normal    Troponin T, High Sensitivity 10     TSH WITH FREE T4 REFLEX - Normal    TSH 0.84     OSMOLALITY, RANDOM URINE - Normal    Osmolality Urine 720     SODIUM RANDOM URINE    Sodium Urine mmol/L <20     CBC WITH PLATELETS AND DIFFERENTIAL    WBC Count 10.9      RBC Count 4.67      Hemoglobin 14.8      Hematocrit 41.9      MCV 90      MCH 31.7      MCHC 35.3      RDW 13.3      Platelet Count 303      % Neutrophils 73      % Lymphocytes 15      % Monocytes 12      % Eosinophils 0      % Basophils 0       % Immature Granulocytes 0      NRBCs per 100 WBC 0      Absolute Neutrophils 7.9      Absolute Lymphocytes 1.6      Absolute Monocytes 1.3      Absolute Eosinophils 0.0      Absolute Basophils 0.0      Absolute Immature Granulocytes 0.0      Absolute NRBCs 0.0              Results for orders placed or performed during the hospital encounter of 06/18/24 (from the past 24 hour(s))   EKG 12-lead, tracing only   Result Value Ref Range    Systolic Blood Pressure  mmHg    Diastolic Blood Pressure  mmHg    Ventricular Rate 59 BPM    Atrial Rate 59 BPM    OH Interval 176 ms    QRS Duration 104 ms     ms    QTc 413 ms    P Axis 40 degrees    R AXIS -19 degrees    T Axis 24 degrees    Interpretation ECG       Sinus bradycardia  Otherwise normal ECG  Unconfirmed report - interpretation of this ECG is computer generated - see medical record for final interpretation    Confirmed by - EMERGENCY ROOM, PHYSICIAN ([id number]),  JOLENE DORAN ([id number]) on 6/19/2024 10:22:13 AM     Symptomatic Influenza A/B, RSV, & SARS-CoV2 PCR (COVID-19) Nasopharyngeal    Specimen: Nasopharyngeal; Swab   Result Value Ref Range    Influenza A PCR Negative Negative    Influenza B PCR Negative Negative    RSV PCR Negative Negative    SARS CoV2 PCR Negative Negative    Narrative    Testing was performed using the Xpert Xpress CoV2/Flu/RSV Assay on the [employer] GeneXpert Instrument. This test should be ordered for the detection of SARS-CoV-2, influenza, and RSV viruses in individuals who meet clinical and/or epidemiological criteria. Test performance is unknown in asymptomatic patients. This test is for in vitro diagnostic use under the FDA EUA for laboratories certified under CLIA to perform high or moderate complexity testing. This test has not been FDA cleared or approved. A negative result does not rule out the presence of PCR inhibitors in the specimen or target RNA in concentration below the limit of detection for the assay. If only one viral  target is positive but coinfection with multiple targets is suspected, the sample should be re-tested with another FDA cleared, approved, or authorized test, if coinfection would change clinical management. This test was validated by the [facility]. These laboratories are certified under the Clinical Laboratory Improvement Amendments of 1988 (CLIA-88) as qualified to perform high complexity laboratory testing.   CBC with platelets differential    Narrative    The following orders were created for panel order CBC with platelets differential.  Procedure                               Abnormality         Status                     ---------                               -----------         ------                     CBC with platelets and d...[[id number]]  Abnormal            Final result               Manual Differential[[id number]]          Abnormal            Final result                 Please view results for these tests on the individual orders.   Comprehensive metabolic panel   Result Value Ref Range    Sodium 125 (L) 135 - 145 mmol/L    Potassium 4.1 3.4 - 5.3 mmol/L    Carbon Dioxide (CO2) 22 22 - 29 mmol/L    Anion Gap 11 7 - 15 mmol/L    Urea Nitrogen 12.0 8.0 - 23.0 mg/dL    Creatinine 0.69 0.67 - 1.17 mg/dL    GFR Estimate >90 >60 mL/min/1.73m2    Calcium 8.8 8.8 - 10.2 mg/dL    Chloride 92 (L) 98 - 107 mmol/L    Glucose 129 (H) 70 - 99 mg/dL    Alkaline Phosphatase 85 40 - 150 U/L    AST 29 0 - 45 U/L    ALT 25 0 - 70 U/L    Protein Total 7.3 6.4 - 8.3 g/dL    Albumin 4.0 3.5 - 5.2 g/dL    Bilirubin Total 0.6 <=1.2 mg/dL   Magnesium   Result Value Ref Range    Magnesium 1.9 1.7 - 2.3 mg/dL   Troponin T, High Sensitivity   Result Value Ref Range    Troponin T, High Sensitivity 10 <=22 ng/L   TSH with free T4 reflex   Result Value Ref Range    TSH 0.84 0.30 - 4.20 uIU/mL   CBC with platelets and differential   Result Value Ref Range    WBC Count 12.0 (H) 4.0 - 11.0 10e3/uL    RBC Count 4.55  4.40 - 5.90 10e6/uL    Hemoglobin 14.2 13.3 - 17.7 g/dL    Hematocrit 41.1 40.0 - 53.0 %    MCV 90 78 - 100 fL    MCH 31.2 26.5 - 33.0 pg    MCHC 34.5 31.5 - 36.5 g/dL    RDW 13.3 10.0 - 15.0 %    Platelet Count 323 150 - 450 10e3/uL    % Neutrophils      % Lymphocytes      % Monocytes      % Eosinophils      % Basophils      % Immature Granulocytes      NRBCs per 100 WBC 0 <1 /100    Absolute Neutrophils      Absolute Lymphocytes      Absolute Monocytes      Absolute Eosinophils      Absolute Basophils      Absolute Immature Granulocytes      Absolute NRBCs 0.0 10e3/uL   Manual Differential   Result Value Ref Range    % Neutrophils 93 %    % Lymphocytes 1 %    % Monocytes 6 %    % Eosinophils 0 %    % Basophils 0 %    Absolute Neutrophils 11.2 (H) 1.6 - 8.3 10e3/uL    Absolute Lymphocytes 0.1 (L) 0.8 - 5.3 10e3/uL    Absolute Monocytes 0.7 0.0 - 1.3 10e3/uL    Absolute Eosinophils 0.0 0.0 - 0.7 10e3/uL    Absolute Basophils 0.0 0.0 - 0.2 10e3/uL    RBC Morphology Confirmed RBC Indices     Platelet Assessment  Automated Count Confirmed. Platelet morphology is normal.     Automated Count Confirmed. Platelet morphology is normal.   Basic metabolic panel   Result Value Ref Range    Sodium 125 (L) 135 - 145 mmol/L    Potassium 4.1 3.4 - 5.3 mmol/L    Chloride 92 (L) 98 - 107 mmol/L    Carbon Dioxide (CO2) 22 22 - 29 mmol/L    Anion Gap 11 7 - 15 mmol/L    Urea Nitrogen 12.0 8.0 - 23.0 mg/dL    Creatinine 0.69 0.67 - 1.17 mg/dL    GFR Estimate >90 >60 mL/min/1.73m2    Calcium 8.8 8.8 - 10.2 mg/dL    Glucose 129 (H) 70 - 99 mg/dL   XR Chest 2 Views    Narrative    EXAM: XR CHEST 2 VIEWS  LOCATION: [facility]  DATE: 6/18/2024    INDICATION: cough  COMPARISON: None.      Impression    IMPRESSION: Heart size and pulmonary vascularity normal. Minimal scarring or atelectasis left base, lungs otherwise clear. Degenerative changes thoracic spine. Anterior wedging midthoracic  vertebral body, presumed chronic. Gas distended bowel loops left   upper quadrant.   CT Head w/o Contrast    Narrative    EXAM: CT HEAD W/O CONTRAST  LOCATION: [facility]  DATE: 6/18/2024    INDICATION: Dizziness.  COMPARISON: None.  TECHNIQUE: Routine CT Head without IV contrast. Multiplanar reformats. Dose reduction techniques were used.    FINDINGS:  INTRACRANIAL CONTENTS: No intracranial hemorrhage, extraaxial collection, or mass effect.  No CT evidence of acute infarct. Mild presumed chronic small vessel ischemic changes. Mild generalized volume loss. No hydrocephalus.     VISUALIZED ORBITS/SINUSES/MASTOIDS: No intraorbital abnormality. Mild mucosal thickening scattered about the paranasal sinuses. No middle ear or mastoid effusion.    BONES/SOFT TISSUES: No acute abnormality.      Impression    IMPRESSION:  1.  No CT evidence for acute intracranial process.  2.  Mild chronic microvascular ischemic changes as above.   UA with Microscopic reflex to Culture    Specimen: Urine, Midstream   Result Value Ref Range    Color Urine Yellow Colorless, Straw, Light Yellow, Yellow    Appearance Urine Clear Clear    Glucose Urine Negative Negative mg/dL    Bilirubin Urine Negative Negative    Ketones Urine 100 (A) Negative mg/dL    Specific Gravity Urine 1.024 1.003 - 1.035    Blood Urine Negative Negative    pH Urine 6.5 5.0 - 7.0    Protein Albumin Urine 30 (A) Negative mg/dL    Urobilinogen Urine Normal Normal, 2.0 mg/dL    Nitrite Urine Negative Negative    Leukocyte Esterase Urine Negative Negative    Mucus Urine Present (A) None Seen /LPF    RBC Urine 1 <=2 /HPF    WBC Urine 2 <=5 /HPF    Narrative    Urine Culture not indicated   Sodium random urine   Result Value Ref Range    Sodium Urine mmol/L <20 mmol/L   Osmolality urine   Result Value Ref Range    Osmolality Urine 720 100 - 1,200 mmol/kg    Narrative    Reference Ranges depend on patient's hydration status and renal  function.   Neonates:  mmol/kg   2 years and older, random specimens: 100-1200 mmol/kg; Greater than 850 mmol/kg after 12 hour fluid restriction  Urine/serum osmolality ratio: 2 years and older: 1.0-3.0; 3.0-4.7 after 12 hour fluid restriction   CT Abdomen Pelvis w Contrast    Narrative    EXAM: CT ABDOMEN PELVIS W CONTRAST  LOCATION: [facility]  DATE: 6/18/2024    INDICATION: leukocytosis, nausea, gas distendxed bowel loops on XR  COMPARISON: October 27, 2023.  TECHNIQUE: CT scan of the abdomen and pelvis was performed following injection of IV contrast. Multiplanar reformats were obtained. Dose reduction techniques were used.  CONTRAST: 135ml isovue 370    FINDINGS:   LOWER CHEST: Normal.    HEPATOBILIARY: Normal liver and gallbladder.    PANCREAS: Normal.    SPLEEN: Prior splenectomy with 35 mm nodule of regenerated splenic tissue in the left upper quadrant.    ADRENAL GLANDS: Normal.    KIDNEYS/BLADDER: Normal.    BOWEL: Prior appendectomy. Stomach, small bowel, and large bowel appear normal. There is normal gas in the stomach and in the splenic flexure of the colon, accounting for the air-filled loops of bowel demonstrated on chest x-ray this evening.    LYMPH NODES: Normal.    VASCULATURE: Scattered atheromatous vascular calcification with normal caliber aorta and normal enhancement of mesenteric vessels.    PELVIC ORGANS: Normal.    MUSCULOSKELETAL: Normal.      Impression    IMPRESSION:   1.  Prior splenectomy and appendectomy. No significant abnormality is present.   Osmolality   Result Value Ref Range    Osmolality Blood 272 (L) 280 - 301 mmol/kg    Narrative    Greater than 385 mmol/kg relates to stupor in hyperglycemia   Greater than 400 mmol/kg can relate to seizures   Greater than 420 mmol/kg can be lethal    Serum Osmalar Gap:   Normal <10   Larger suggest unmeasured substances present in serum (ethanol, methanol, isopropanol, mannitol, ethylene  glycol).   Basic metabolic panel   Result Value Ref Range    Sodium 127 (L) 135 - 145 mmol/L    Potassium 4.5 3.4 - 5.3 mmol/L    Chloride 93 (L) 98 - 107 mmol/L    Carbon Dioxide (CO2) 24 22 - 29 mmol/L    Anion Gap 10 7 - 15 mmol/L    Urea Nitrogen 9.1 8.0 - 23.0 mg/dL    Creatinine 0.76 0.67 - 1.17 mg/dL    GFR Estimate >90 >60 mL/min/1.73m2    Calcium 9.2 8.8 - 10.2 mg/dL    Glucose 111 (H) 70 - 99 mg/dL   CBC with platelets differential    Narrative    The following orders were created for panel order CBC with platelets differential.  Procedure                               Abnormality         Status                     ---------                               -----------         ------                     CBC with platelets and d...[[id number]]                      Final result                 Please view results for these tests on the individual orders.   CBC with platelets and differential   Result Value Ref Range    WBC Count 10.9 4.0 - 11.0 10e3/uL    RBC Count 4.67 4.40 - 5.90 10e6/uL    Hemoglobin 14.8 13.3 - 17.7 g/dL    Hematocrit 41.9 40.0 - 53.0 %    MCV 90 78 - 100 fL    MCH 31.7 26.5 - 33.0 pg    MCHC 35.3 31.5 - 36.5 g/dL    RDW 13.3 10.0 - 15.0 %    Platelet Count 303 150 - 450 10e3/uL    % Neutrophils 73 %    % Lymphocytes 15 %    % Monocytes 12 %    % Eosinophils 0 %    % Basophils 0 %    % Immature Granulocytes 0 %    NRBCs per 100 WBC 0 <1 /100    Absolute Neutrophils 7.9 1.6 - 8.3 10e3/uL    Absolute Lymphocytes 1.6 0.8 - 5.3 10e3/uL    Absolute Monocytes 1.3 0.0 - 1.3 10e3/uL    Absolute Eosinophils 0.0 0.0 - 0.7 10e3/uL    Absolute Basophils 0.0 0.0 - 0.2 10e3/uL    Absolute Immature Granulocytes 0.0 <=0.4 10e3/uL    Absolute NRBCs 0.0 10e3/uL   Sodium   Result Value Ref Range    Sodium 129 (L) 135 - 145 mmol/L   Echo Complete   Result Value Ref Range    LVEF  60-65%     Narrative    [id number]  [id number]  [id number]  [id number]^BRIDGER^HUONG     [facility]  [facility]  Echocardiography Laboratory  [address] [place] [id number]     Name: MENDOZA CHAVEZ  MRN: [id number]  : 1955  Study Date: 2024 12:23 PM  Age: 69 yrs  Gender: Male  Patient Location: [facility]  Reason For Study: Syncope  Ordering Physician: HUONG SPARKS  Performed By: Carmen Keen     BSA: 2.3 m2  Height: 76 in  Weight: 229 lb  HR: 54  BP: 150/82 mmHg  ______________________________________________________________________________  Procedure  Complete Portable Echo Adult.  ______________________________________________________________________________  Interpretation Summary  Global and regional left ventricular function is normal with an EF of 60-65%.  Global right ventricular function is normal.  No significant valvular abnormalities were noted.  Aortic root dilatation is present. Sinuses of Valsalva 4.2 cm.  There is no prior study for direct comparison.  ______________________________________________________________________________  Left Ventricle  Left ventricular size is normal. Global and regional left ventricular function  is normal with an EF of 60-65%. Thickening of the anterobasal septum is  present. Relative wall thickness is increased consistent with concentric  remodeling. Left ventricular diastolic function is normal. Diastolic Doppler  findings (E/E' ratio and/or other parameters) suggest left ventricular filling  pressures are normal.     Right Ventricle  The right ventricle is normal size. Global right ventricular function is  normal.     Atria  The right atria appears normal. Mild left atrial enlargement is present.     Mitral Valve  The mitral valve is normal. Trace mitral insufficiency is present.     Aortic Valve  Mild aortic valve calcification is present. Trace aortic insufficiency is  present.     Tricuspid Valve  The tricuspid valve is normal. Trace tricuspid insufficiency is present. The  right ventricular systolic pressure is approximated at  24.2 mmHg plus the  right atrial pressure. Pulmonary artery systolic pressure is normal.     Pulmonic Valve  The pulmonic valve is normal. Trace pulmonic insufficiency is present.     Vessels  The inferior vena cava was normal in size with preserved respiratory  variability. Aortic root dilatation is present. Sinuses of Valsalva 4.2 cm.  Ascending aorta 3.9 cm. IVC diameter <2.1 cm collapsing >50% with sniff  suggests a normal RA pressure of 3 mmHg.     Pericardium  No pericardial effusion is present.     Compared to Previous Study  There is no prior study for direct comparison.  ______________________________________________________________________________  MMode/2D Measurements & Calculations     IVSd: 1.5 cm  LVIDd: 4.4 cm  LVIDs: 3.0 cm  LVPWd: 1.2 cm  FS: 30.6 %  LV mass(C)d: 227.4 grams  LV mass(C)dI: 96.9 grams/m2  Ao root diam: 4.2 cm  asc Aorta Diam: 3.9 cm  LVOT diam: 2.4 cm  LVOT area: 4.5 cm2  Ao root diam index Ht(cm/m): 2.2  Ao root diam index BSA (cm/m2): 1.8  Asc Ao diam index BSA (cm/m2): 1.7  Asc Ao diam index Ht(cm/m): 2.0  LA Volume (BP): 92.9 ml     LA Volume Index (BP): 39.5 ml/m2  RV Base: 3.9 cm  RWT: 0.56  TAPSE: 2.8 cm     Doppler Measurements & Calculations  MV E max santana: 66.4 cm/sec  MV A max santana: 47.6 cm/sec  MV E/A: 1.4  MV dec slope: 419.0 cm/sec2  MV dec time: 0.16 sec  Ao V2 max: 92.7 cm/sec  Ao max PG: 3.4 mmHg  Ao V2 mean: 63.0 cm/sec  Ao mean P.0 mmHg  Ao V2 VTI: 22.4 cm  GOLDY(I,D): 3.6 cm2  GOLDY(V,D): 3.5 cm2  LV V1 max P.1 mmHg  LV V1 max: 71.7 cm/sec  LV V1 VTI: 17.6 cm  SV(LVOT): 79.6 ml  SI(LVOT): 33.9 ml/m2  PA acc time: 0.09 sec  PI end-d santana: 112.0 cm/sec  TR max santana: 246.0 cm/sec  TR max P.2 mmHg     AV Santana Ratio (DI): 0.77  GOLDY Index (cm2/m2): 1.5  E/E' av.2  Lateral E/e': 5.9  Medial E/e': 8.5  RV S Santana: 12.9 cm/sec     ______________________________________________________________________________  Report approved by: Mirian Trimble 2024  02:26 PM         Sodium   Result Value Ref Range    Sodium 129 (L) 135 - 145 mmol/L       Labs, vital signs, and imaging studies were reviewed by me.    Medications   atorvastatin (LIPITOR) tablet 20 mg (20 mg Oral $Given 6/19/24 0342)   lisinopril (ZESTRIL) tablet 40 mg ( Oral Unheld by provider 6/19/24 [id number])   senna-docusate (SENOKOT-S/PERICOLACE) 8.6-50 MG per tablet 1 tablet (has no administration in time range)     Or   senna-docusate (SENOKOT-S/PERICOLACE) 8.6-50 MG per tablet 2 tablet (has no administration in time range)   ondansetron (ZOFRAN ODT) ODT tab 4 mg (has no administration in time range)     Or   ondansetron (ZOFRAN) injection 4 mg (has no administration in time range)   acetaminophen (TYLENOL) tablet 650 mg (has no administration in time range)     Or   acetaminophen (TYLENOL) Suppository 650 mg (has no administration in time range)   melatonin tablet 1 mg (has no administration in time range)   benzonatate (TESSALON) capsule 100 mg (has no administration in time range)   sodium chloride 0.9 % bag 500mL for CT scan flush use (84 mLs Intravenous $Given 6/18/24 2114)       Assessments & Plan (with Medical Decision Making)   Gio Jean is a 69 year old male who presents to the emergency department after loss of consciousness. Differential diagnosis includes vasovagal episode, orthostatic hypotension/dehydration, seizure (of higher concern given urinary incontinence), cardiac arrhythmia, electrolyte abnormality, anemia, viral syndrome.  Labs, CT head, chest x-ray, EKG ordered to further evaluate the patient in the emergency department.    EKG shows sinus bradycardia.    Laboratory workup is remarkable for hyponatremia with sodium of 125, per chart review, patient does have a history of hyponatremia with baseline sodium around 130.      Chest x-ray shows scarring versus atelectasis at left lung base, lungs otherwise clear.  There are gas distended bowel loops in the left upper quadrant.   Additionally, patient's white blood cell count is slightly elevated at 12.0, given these findings and patient's nausea and vomiting, will obtain CT of the abdomen pelvis to rule out intra-abdominal pathology such as small bowel obstruction or infectious process.    CT shows no acute abnormality, the patient has had a prior splenectomy and appendectomy    Critical care was not performed.     Medical Decision Making  The patient's presentation was of high complexity (an acute health issue posing potential threat to life or bodily function).    The patient's evaluation involved:  an assessment requiring an independent historian (patient's wife who witnessed event)  review of 3+ test result(s) ordered prior to this encounter (previous labs for comparison)  ordering and/or review of 3+ test(s) in this encounter (see separate area of note for details)  independent interpretation of testing performed by another health professional (see separate area of note for details)  discussion of management or test interpretation with another health professional (see separate area of note for details)    The patient's management necessitated moderate risk (prescription drug management including medications given in the ED) and high risk (a decision regarding hospitalization).    Chest x-ray, CT images were personally reviewed by me, I agree with the radiology reads.    I have reviewed the nursing notes.    I have reviewed the findings, diagnosis, plan and need for follow up with the patient.    Patient and their further management were discussed with internal medicine, to be admitted to their service. Plan was discussed with patient who understands and agrees with plan.    Discharge Medication List as of 6/19/2024  5:22 PM          Final diagnoses:   Hyponatremia       NAKIA LAUREN MD  6/18/2024   [facility] EMERGENCY DEPARTMENT       Nakia Lauren MD  06/19/24 1822

## 2024-06-19 NOTE — PLAN OF CARE
Goal Outcome Evaluation:    VS: BP (!) 144/76 (BP Location: Right arm)   Pulse 61   Temp 98  F (36.7  C)   Resp 16   SpO2 96%    O2: Stable on room air, denies chest pain and shortness of breath   Output: Voids spontaneously without difficulty   Activity: independent   Up for meals? Yes   Skin: Intact, scabs on bilateral elbows   Pain: Denies pain   CMS: Intact, denies numbness and tingling, A/Ox4   Dressing: N/a   Diet: Regular    LDA: R PIV removed   Equipment: IV pump and pole, personal belongings, call light within reach    Plan: TBD, continue POC          Plan of Care Reviewed With: patient    Overall Patient Progress: improvingOverall Patient Progress: improving

## 2024-06-19 NOTE — ED TRIAGE NOTES
"Pt BIBA with c/o flu like symptoms. Pt sates for the past three days having nasal drainage & sinus pressure. Endorses feeling dehydrated, dizzy, and nauseated today. This afternoon has an episode of \"passing out\" where pt noted to lose control of his bladder. Pt here for further evaluation.      Triage Assessment (Adult)       Row Name 06/18/24 1926          Triage Assessment    Airway WDL WDL        Respiratory WDL    Respiratory WDL WDL        Skin Circulation/Temperature WDL    Skin Circulation/Temperature WDL WDL        Cardiac WDL    Cardiac WDL WDL        Peripheral/Neurovascular WDL    Peripheral Neurovascular WDL WDL        Cognitive/Neuro/Behavioral WDL    Cognitive/Neuro/Behavioral WDL WDL                     "

## 2024-06-19 NOTE — PROGRESS NOTES
6MS ADMISSION    D: Patient admitted/transferred from  ED via stretcher for hyponatremia.     I: Upon arrival to the unit patient was oriented to room, unit, and call light. Patient s height, weight, and vital signs were obtained. Allergies reviewed and allergy band applied. Provider notified of patient s arrival on the unit. Adult AVS completed. Head to toe assessment completed. Education assessment completed. Care plan initiated.    A: Vital signs stable upon admission. Patient rates pain at 0/10. Two RN skin assessment completed yes. Second RN was Sasha LILLY Significant Skin Findings include scabs to bilateral elbows. [facility] Nurse Consult Ordered No . Bed Algorithm can be found in PCS flow sheets (Support Surface Algorithm) and on IP [facility] NURSE RESOURCE TAB, was this used during this assessment? No. Was a bariatric bed frame ordered? No. Was an air pump added to the Isoflex mattress? No    P: Continue to monitor patient s sodium levels and intervene as needed. Continue with plan of care. Notify provider with any concerns or changes in patient status.    Sylvia Mckeon RN on 6/19/2024 at 8:28 AM

## 2024-06-19 NOTE — DISCHARGE SUMMARY
[facility]  Hospitalist Discharge Summary      Date of Admission:  6/18/2024  Date of Discharge:  6/19/2024   Discharging Provider: Verónica Penaloza MD  Discharge Service: Hospitalist Service, GOLD TEAM 18    Discharge Diagnoses     # Syncope, suspected vagovagal, cannot rule out seizure   # Hyponatremia, acute on chronic  # Viral URI  # Nausea  # Leukocytosis  # HTN  #History splenectomy   # HLP      Clinically Significant Risk Factors          Follow-ups Needed After Discharge   Follow-up Appointments     Adult [facility] Follow-up and recommended labs and tests      Follow up with primary care provider, Nuno Johns , within 7 days for   hospital follow- up.  The following labs/tests are recommended: repeat   serum Sodium, also heart monitor  also you need neurology referral .      Appointments on [place] and/or [facility] (with [facility] or [facility]   provider or service). Call [phone] if you haven't heard regarding   these appointments within 7 days of discharge.            Discharge Disposition   Discharged to home  Condition at discharge: Stable    Hospital Course     Gio Jean is a 69 year old male admitted on 6/18/2024. He has a history of HTN, HLD, ITP s/p splenectomy 1996 and is admitted for syncope episode and hyponatremia.      Patient  feels good, he states he got up, felt dizzy, set down and seems to have passed out, denies any chest pain  no shortness of breath  pre or post also denied any palpitations. He has had several days of cold symptoms, less oral intake.    Wife states  he had seizure , when asked more questions   after patient  set down he apparently  just stared ahead, not responding , went out for about 10 seconds, made some groaning sounds, no seizure , shaking activity noted by his  wife . He was incontinent of bladder not bowel to then came back, Wife states it took a short while for him to get back to normal.   Per wife he had  similar episode about 2 years ago and they could not say what it was back then         # Syncope, suspected vagovagal, cannot rule out seizure   - Presented with witnessed syncope at home with urinary incontinence. No jerking. Had prodrome syndrome of nausea and dizziness in a setting of 3 days of URI symptoms. No chest pain. One prior episode of syncope during intoxication ( though now patient  tells me he had stopped drinking a while  before that ) . Initial vitals were stable, /68, HR 68. Unremarkable cardiac exam. EKG with sinus rate 59 without STT changes. No arrhythmia noted on telemetry . Neg troponin and CT head. , BMP remarkable for Na 125. Hgb 14.2 WBC 12.0, N 11.2.   - echo  6/19 was normal  - he denies alcohol usem he drank many years ago   - he received IV f , now feeling back to normal  - since cannot completely rule out seizure I did discuss driving restrictions with patient  and his wife/ he needs a neurology referral, he doctors at [facility] so they would prefer to see someone with [facility] so I asked them to follow up  with his  primary care MD and karis neurology referral. Also recommended heart monitor    - no indication for anticonvulsives at this point  - they know to return to ER if this recurs          # Hyponatremia, acute on chronic  - suspect prerenal , Na not low enough to cause seizure , LFT normal   Hx of chronic hyponatremia, Na ~ 130 in 2022, lowest at 127. Never formally work up.  Presented with Na 125 and Cl 92 in a setting of poor oral intake and syncope.   Most likely hypovolemia hyponatremia on top of chronic hyponatremia of unknown etiologies.   - Na now has remained at 129 , will need out patient  repeat Na , did  discussed with  patient  and his wife      # Viral URI  # Nausea  # Leukocytosis  A few days of nasal secretion, sore throat, dry cough and low grade fever. Generalized weakness. COVID, RSV, and flu swab were negative. CXR with no infiltration.  Most likely non-specific viral URI. WBC showed leukocytosis. UA negative. No indication for antibiotics and will monitor WBC.      # HTN  Doesn't use Clonidine  - restart Lisinopril  as blood pressure  up, he has blood pressure  monitor at home  and we did discuss to check his Bps and do  not take lisinopril is SBP < 110      History splenectomy   - I believe he ha dreceived his medications      Bowel distention noted on CXR  - CT abdomen shows normal gas patten , no significant abnormality      # HLP - PTA Atorvastatin     Consultations This Hospital Stay   None    Code Status   Full Code    Time Spent on this Encounter   I, Verónica Penaloza MD, personally saw the patient today and spent greater than 30 minutes discharging this patient.       Verónica Penaloza MD  [facility] MED SURG  [address] [place] [id number]  Phone: [phone]  Fax: [phone]  ______________________________________________________________________    Physical Exam   Vital Signs: Temp: 98  F (36.7  C) Temp src: Oral BP: (!) 144/76 Pulse: 61   Resp: 16 SpO2: 96 % O2 Device: None (Room air)    Weight: 0 lbs 0 oz  General appearence: awake alert   no apparent distress    RESPIRATORY: lungs clear    CARDIOVASCULAR:S1 S2 regular rate and rhythm, no rubs gallops or murmurs appreciated  GASTROINTESTINAL:soft, non-distended , non-tender , + bowel sounds, no masses felt   SKIN: warm and dry, no mottling noted   NEUROLOGIC; awake alert and oriented, no focal deficits found  EXTREMITIES: no clubbing, cyanosis or edema , moves all extremity, good pedal pulses   MUSCULOSKELETAL: without deformity            Primary Care Physician   Nuno Johns    Discharge Orders      Reason for your hospital stay    Syncope , low serum Sodium     Activity    Your activity upon discharge: activity as tolerated     Adult [facility] Follow-up and recommended labs and tests    Follow up with primary care provider, Nuno Johns , within 7  days for hospital follow- up.  The following labs/tests are recommended: repeat serum Sodium, also heart monitor  also you need neurology referral .      Appointments on [place] and/or [facility] (with [facility] or [facility] provider or service). Call [phone] if you haven't heard regarding these appointments within 7 days of discharge.     Diet    Follow this diet upon discharge: as tolerated       Significant Results and Procedures   Most Recent 3 CBC's:  Recent Labs   Lab Test 06/19/24  0428 06/18/24 2057 07/01/21  0155   WBC 10.9 12.0* 7.8   HGB 14.8 14.2 16.0   MCV 90 90 94    323 409     Most Recent 3 BMP's:  Recent Labs   Lab Test 06/19/24  1440 06/19/24  0956 06/19/24  0408 06/18/24 2057 06/18/24 2057 07/01/21  0155   * 129* 127*   < > 125*  125* 129*   POTASSIUM  --   --  4.5  --  4.1  4.1 4.1   CHLORIDE  --   --  93*  --  92*  92* 93*   CO2  --   --  24  --  22  22 29   BUN  --   --  9.1  --  12.0  12.0 11   CR  --   --  0.76  --  0.69  0.69 0.69   ANIONGAP  --   --  10  --  11  11 7   YAIMA  --   --  9.2  --  8.8  8.8 9.1   GLC  --   --  111*  --  129*  129* 99    < > = values in this interval not displayed.     Most Recent 2 LFT's:  Recent Labs   Lab Test 06/18/24 2057   AST 29   ALT 25   ALKPHOS 85   BILITOTAL 0.6     Most Recent 3 INR's:No lab results found.  Most Recent 3 Troponin's:No lab results found.,   Results for orders placed or performed during the hospital encounter of 06/18/24   CT Head w/o Contrast    Narrative    EXAM: CT HEAD W/O CONTRAST  LOCATION: [facility]  DATE: 6/18/2024    INDICATION: Dizziness.  COMPARISON: None.  TECHNIQUE: Routine CT Head without IV contrast. Multiplanar reformats. Dose reduction techniques were used.    FINDINGS:  INTRACRANIAL CONTENTS: No intracranial hemorrhage, extraaxial collection, or mass effect.  No CT evidence of acute infarct. Mild presumed chronic small vessel ischemic changes.  Mild generalized volume loss. No hydrocephalus.     VISUALIZED ORBITS/SINUSES/MASTOIDS: No intraorbital abnormality. Mild mucosal thickening scattered about the paranasal sinuses. No middle ear or mastoid effusion.    BONES/SOFT TISSUES: No acute abnormality.      Impression    IMPRESSION:  1.  No CT evidence for acute intracranial process.  2.  Mild chronic microvascular ischemic changes as above.   XR Chest 2 Views    Narrative    EXAM: XR CHEST 2 VIEWS  LOCATION: [facility]  DATE: 6/18/2024    INDICATION: cough  COMPARISON: None.      Impression    IMPRESSION: Heart size and pulmonary vascularity normal. Minimal scarring or atelectasis left base, lungs otherwise clear. Degenerative changes thoracic spine. Anterior wedging midthoracic vertebral body, presumed chronic. Gas distended bowel loops left   upper quadrant.   CT Abdomen Pelvis w Contrast    Narrative    EXAM: CT ABDOMEN PELVIS W CONTRAST  LOCATION: [facility]  DATE: 6/18/2024    INDICATION: leukocytosis, nausea, gas distendxed bowel loops on XR  COMPARISON: October 27, 2023.  TECHNIQUE: CT scan of the abdomen and pelvis was performed following injection of IV contrast. Multiplanar reformats were obtained. Dose reduction techniques were used.  CONTRAST: 135ml isovue 370    FINDINGS:   LOWER CHEST: Normal.    HEPATOBILIARY: Normal liver and gallbladder.    PANCREAS: Normal.    SPLEEN: Prior splenectomy with 35 mm nodule of regenerated splenic tissue in the left upper quadrant.    ADRENAL GLANDS: Normal.    KIDNEYS/BLADDER: Normal.    BOWEL: Prior appendectomy. Stomach, small bowel, and large bowel appear normal. There is normal gas in the stomach and in the splenic flexure of the colon, accounting for the air-filled loops of bowel demonstrated on chest x-ray this evening.    LYMPH NODES: Normal.    VASCULATURE: Scattered atheromatous vascular calcification with  normal caliber aorta and normal enhancement of mesenteric vessels.    PELVIC ORGANS: Normal.    MUSCULOSKELETAL: Normal.      Impression    IMPRESSION:   1.  Prior splenectomy and appendectomy. No significant abnormality is present.   Echo Complete     Value    LVEF  60-65%    [facility]    [id number]  [id number]  [id number]  [id number]^BRIDGER^HUONG     [facility],[place]  Echocardiography Laboratory  [address] [place] [address]     Name: MENDOZA CHAVEZ  MRN: [id number]  : 1955  Study Date: 2024 12:23 PM  Age: 69 yrs  Gender: Male  Patient Location: [facility]  Reason For Study: Syncope  Ordering Physician: HUONG SPARKS  Performed By: Carmen Keen     BSA: 2.3 m2  Height: 76 in  Weight: 229 lb  HR: 54  BP: 150/82 mmHg  ______________________________________________________________________________  Procedure  Complete Portable Echo Adult.  ______________________________________________________________________________  Interpretation Summary  Global and regional left ventricular function is normal with an EF of 60-65%.  Global right ventricular function is normal.  No significant valvular abnormalities were noted.  Aortic root dilatation is present. Sinuses of Valsalva 4.2 cm.  There is no prior study for direct comparison.  ______________________________________________________________________________  Left Ventricle  Left ventricular size is normal. Global and regional left ventricular function  is normal with an EF of 60-65%. Thickening of the anterobasal septum is  present. Relative wall thickness is increased consistent with concentric  remodeling. Left ventricular diastolic function is normal. Diastolic Doppler  findings (E/E' ratio and/or other parameters) suggest left ventricular filling  pressures are normal.     Right Ventricle  The right ventricle is normal size. Global right ventricular function is  normal.     Atria  The right atria appears normal.  Mild left atrial enlargement is present.     Mitral Valve  The mitral valve is normal. Trace mitral insufficiency is present.     Aortic Valve  Mild aortic valve calcification is present. Trace aortic insufficiency is  present.     Tricuspid Valve  The tricuspid valve is normal. Trace tricuspid insufficiency is present. The  right ventricular systolic pressure is approximated at 24.2 mmHg plus the  right atrial pressure. Pulmonary artery systolic pressure is normal.     Pulmonic Valve  The pulmonic valve is normal. Trace pulmonic insufficiency is present.     Vessels  The inferior vena cava was normal in size with preserved respiratory  variability. Aortic root dilatation is present. Sinuses of Valsalva 4.2 cm.  Ascending aorta 3.9 cm. IVC diameter <2.1 cm collapsing >50% with sniff  suggests a normal RA pressure of 3 mmHg.     Pericardium  No pericardial effusion is present.     Compared to Previous Study  There is no prior study for direct comparison.  ______________________________________________________________________________  MMode/2D Measurements & Calculations     IVSd: 1.5 cm  LVIDd: 4.4 cm  LVIDs: 3.0 cm  LVPWd: 1.2 cm  FS: 30.6 %  LV mass(C)d: 227.4 grams  LV mass(C)dI: 96.9 grams/m2  Ao root diam: 4.2 cm  asc Aorta Diam: 3.9 cm  LVOT diam: 2.4 cm  LVOT area: 4.5 cm2  Ao root diam index Ht(cm/m): 2.2  Ao root diam index BSA (cm/m2): 1.8  Asc Ao diam index BSA (cm/m2): 1.7  Asc Ao diam index Ht(cm/m): 2.0  LA Volume (BP): 92.9 ml     LA Volume Index (BP): 39.5 ml/m2  RV Base: 3.9 cm  RWT: 0.56  TAPSE: 2.8 cm     Doppler Measurements & Calculations  MV E max joão: 66.4 cm/sec  MV A max joão: 47.6 cm/sec  MV E/A: 1.4  MV dec slope: 419.0 cm/sec2  MV dec time: 0.16 sec  Ao V2 max: 92.7 cm/sec  Ao max PG: 3.4 mmHg  Ao V2 mean: 63.0 cm/sec  Ao mean P.0 mmHg  Ao V2 VTI: 22.4 cm  GOLDY(I,D): 3.6 cm2  GOLDY(V,D): 3.5 cm2  LV V1 max P.1 mmHg  LV V1 max: 71.7 cm/sec  LV V1 VTI: 17.6 cm  SV(LVOT): 79.6  ml  SI(LVOT): 33.9 ml/m2  PA acc time: 0.09 sec  PI end-d santana: 112.0 cm/sec  TR max santana: 246.0 cm/sec  TR max P.2 mmHg     AV Santana Ratio (DI): 0.77  GOLDY Index (cm2/m2): 1.5  E/E' av.2  Lateral E/e': 5.9  Medial E/e': 8.5  RV S Santana: 12.9 cm/sec     ______________________________________________________________________________  Report approved by: Mirian Trimble 2024 02:26 PM             Discharge Medications   Current Discharge Medication List        CONTINUE these medications which have NOT CHANGED    Details   atorvastatin (LIPITOR) 20 MG tablet Take 20 mg by mouth daily      lisinopril (ZESTRIL) 20 MG tablet Take 20 mg by mouth daily           Allergies   Allergies   Allergen Reactions    Sulfa Antibiotics Hives

## 2024-06-19 NOTE — MEDICATION SCRIBE - ADMISSION MEDICATION HISTORY
Medication Scribe Admission Medication History    Admission medication history is complete. The information provided in this note is only as accurate as the sources available at the time of the update.    Information Source(s): Patient via in-person    Pertinent Information: Pt reported taking medications on PTA medication list.     Changes made to PTA medication list:  Added: None  Deleted: Clonidine 0.1 mg tabs, Loratadine 10 mg tabs.   Changed: None    Allergies reviewed with patient and updates made in EHR: yes    Medication History Completed By: Alix Smith 6/19/2024 6:43 AM    PTA Med List   Medication Sig Last Dose    ATORVASTATIN CALCIUM PO Take 20 mg by mouth daily 6/17/2024    LISINOPRIL PO Take 40 mg by mouth daily  6/18/2024

## 2024-06-19 NOTE — UTILIZATION REVIEW
"Admission Status; Secondary Review Determination     Admission Date: 6/18/2024  7:55 PM       Under the authority of the Utilization Management Committee, the utilization review process indicated a secondary review on the above patient.  The review outcome is based on review of the medical records, discussions with staff, and applying clinical experience noted on the date of the review.          (x) Observation Status Appropriate - This patient does not meet hospital inpatient criteria and is placed in observation status. If this patient's primary payer is Medicare and was admitted as an inpatient, Condition Code 44 should be used and patient status changed to \"observation\".       RATIONALE FOR DETERMINATION      Brief clinical presentation, information copied from the chart, abbreviated and edited for relevant content:     Not meeting IP criteria, messaged team to change to OBS.       A 69-year-old male with a history of hypertension, hyperlipidemia, and ITP post-splenectomy in 1996, was admitted on 6/18/2024 for a syncope episode and hyponatremia. He presented with witnessed syncope accompanied by urinary incontinence, preceded by nausea and dizziness over three days of URI symptoms. Initial vitals were stable (/68, HR 68), and EKG showed sinus rhythm at 59 bpm without ST-T changes. Troponin was negative, and CT head was unremarkable. Labs revealed Na 125, Cl 92, Hgb 14.2, WBC 12.0, and Neutrophils 11.2. The syncope is suspected to be vagovagal, likely exacerbated by viral illness, with low suspicion for cardiac or seizure etiologies. Hyponatremia is considered acute on chronic, likely due to hypovolemia from poor oral intake, with a baseline Na around 130.      This patient is expected to need a short hospitalization primarily for stabilization and correction of acute issues. His syncope episode appears to be vagovagal in nature, with no signs of cardiac or neurological complications, and his hyponatremia, " although acute, is being managed effectively with fluids and monitoring. Given the absence of severe or ongoing symptoms, and the fact that his conditions are responding to initial treatments, there is no indication for a prolonged inpatient stay. Further outpatient follow-up can manage any required adjustments or additional diagnostics.      The severity of illness, intensity of cares provided, risk for adverse outcome, and expected LOS make the care appropriate for observation.       The information on this document is developed by the utilization review team in order for the business office to ensure compliance.  This only denotes the appropriateness of proper admission status and does not reflect the quality of care rendered.         The definitions of Inpatient Status and Observation Status used in making the determination above are those provided in the CMS Coverage Manual, Chapter 1 and Chapter 6, section 70.4.      Sincerely,     Sherri Singh MD   Utilization Review/ Case Management  [facility].

## 2024-06-19 NOTE — H&P
[facility]    History and Physical - Medicine Service, [facility] TEAM        Date of Admission:  6/18/2024    Assessment & Plan      Gio Jean is a 69 year old male admitted on 6/18/2024. He has a history of HTN, HLD, ITP s/p splenectomy 1996 and is admitted for syncope episode and hyponatremia.    # Syncope, suspected vagovagal  Presented with witnessed syncope at home with urinary incontinence. No jerking. Had prodrome syndrome of nausea and dizziness in a setting of 3 days of URI symptoms. No chest pain. One prior episode of syncope during intoxication. Initial vitals were stable, /68, HR 68. Unremarkable cardiac exam. EKG with sinus rate 59 without STT changes. Neg troponin and CT head. , BMP remarkable for Na 125. Hgb 14.2 WBC 12.0, N 11.2.     Most concern for syncope episode with witnessed loss of consciousness. Likely vagovagal episode with characteristics prodrome in a setting of viral illness. No blood loss. Low suspicion of cardiac syncope with normal EKG, neg troponin, normal cardiac exam, and no hx of heart disease. Low concern for seizure with no jerking and no postictal phase. Doesn't think that Na 125 is a cause of seizure given his chronic hypoNa baseline at 130.    - s/p LR 1 L in the ED  - Orthostatic BP - might already improve with fluid  - Cardiac monitoring  - Will defer ziopatch, echocardiogram to primary team    # Hyponatremia, acute on chronic  Hx of chronic hyponatremia, Na ~ 130 in 2022, lowest at 127. Never formally work up.  Presented with Na 125 and Cl 92 in a setting of poor oral intake and syncope.   Most likely hypovolemia hyponatremia on top of chronic hyponatremia of unknown etiologies.   No headache or seizure. Nausea has improved, so no indication for 3% NSS treatment.   S/p fluid in the ED. Will obtain urine Na and osm before giving more fluid.     - s/p LR 1 L in the ED  - urine sodium, urine osm  - repeat  BMP, serum osm  -  ml/hr after lab draw  - Defer nephrology consult to primary team    # Viral URI  # Nausea  # Leukocytosis  A few days of nasal secretion, sore throat, dry cough and low grade fever. Generalized weakness. COVID, RSV, and flu swab were negative. CXR with no infiltration. Most likely non-specific viral URI. WBC showed leukocytosis. UA negative. No indication for antibiotics and will monitor WBC.    - Symptomatic treatment : Tessalon pedro luis, onsia prn  - Monitor CBC     # HTN  Doesn't use Clonidine  - Hold PTA Lisinopril - syncope    # HLP - PTA Atorvastatin    Diet:  Regular  DVT Prophylaxis: Ambulate every shift  Ross Catheter: Not present  Fluids: LR maintenance  Lines: None     Cardiac Monitoring: None  Code Status:  Full, confirmed at admission with the patient    Clinically Significant Risk Factors Present on Admission         # Hyponatremia: Lowest Na = 125 mmol/L in last 2 days, will monitor as appropriate          # Hypertension: Home medication list includes antihypertensive(s)        Disposition Plan      Expected Discharge Date: 06/21/2024                Will be staffed in the AM.      North Veloz MD  Medicine Service, [facility]  Securely message with [employer] (more info)  Text page via [facility] Paging/Directory   See signed in provider for up to date coverage information  ______________________________________________________________________    Chief Complaint   Pass out     History is obtained from the patient    History of Present Illness   Gio Jean is a 69 year old male who has a history of HTN, HLD, ITP s/p splenectomy 1996 and presented after a pass out episode at home.    He has been feeling sick for the past 2-3 days. Endorses runny nose, sore throat, dry cough without sputum, low grade fever, poor appetite and generalized weakness for the past 2-3 days. Denies headache, abdominal pain, diarrhea  or dysuria. Yesterday (6/18), he feel nausea during the day without vomiting.     Around 6 pm on 6/18, he was walking but felt very dizzy with room spinning around. He sat down but then loss conscious for ~ 10 seconds. He had bladder incontinence during that episode. His wife, who witnessed the event said that he was unconscious, became frozen but had no jerking activities. She called 911. Denies chest pain before the syncope. He passed out 1 time before 2-3 years ago when he was intoxicated, doesn't remember whether he seized. No history of heart disease.     No family history with sudden cardiac death. His dad had CAD s/p bypass surgery in his 60s and had heart attack in his 70s.  No smoking.  Prior heavy alcohol drinker but has quit.  Uses edible/ smoking marijuana but not everyday.    Lives in [facility] [place] [facility] with his wife. Retired.    Past Medical History    Past Medical History:   Diagnosis Date    Hypercholesteremia     Hypertension     Inguinal hernia        Past Surgical History   Past Surgical History:   Procedure Laterality Date    APPENDECTOMY      HERNIA REPAIR      with mesh    SPLENECTOMY         Prior to Admission Medications   Prior to Admission Medications   Prescriptions Last Dose Informant Patient Reported? Taking?   ATORVASTATIN CALCIUM PO   Yes No   Sig: Take 20 mg by mouth daily   LISINOPRIL PO   Yes No   Sig: Take 40 mg by mouth daily    cloNIDine (CATAPRES) 0.1 MG tablet   No No   Sig: Take 1 tablet (0.1 mg) by mouth 2 times daily   loratadine (CLARITIN) 10 MG tablet   Yes No   Sig: Take 10 mg by mouth daily      Facility-Administered Medications: None        Allergies   Allergies   Allergen Reactions    Sulfa Antibiotics Hives        Physical Exam   Vital Signs: Temp: 98.5  F (36.9  C) Temp src: Oral BP: (!) 149/88 Pulse: 70   Resp: 16 SpO2: 100 % O2 Device: None (Room air)    Weight: 0 lbs 0 oz    General Appearance: Alert, active, cooperative, look tired, coughing  HEENT: mild erythema of  pharynx but no swelling, no stridor  Respiratory: non-labored, clear both lungs, no adventitious sounds  Cardiovascular: regular, normal S1S2, no murmur  GI: soft, no tenderness, no guarding  Skin: no rash  Other: No edema, move all extremities equally, no facial palsy    Medical Decision Making               Data     I have personally reviewed the following data over the past 24 hrs:    12.0 (H)  \   14.2   / 323     125 (L) 92 (L) 12.0 /  129 (H)   4.1 22 0.69 \     ALT: 25 AST: 29 AP: 85 TBILI: 0.6   ALB: 4.0 TOT PROTEIN: 7.3 LIPASE: N/A     Trop: 10 BNP: N/A     TSH: 0.84 T4: N/A A1C: N/A       Imaging results reviewed over the past 24 hrs:   Recent Results (from the past 24 hour(s))   XR Chest 2 Views    Narrative    EXAM: XR CHEST 2 VIEWS  LOCATION: [facility]  DATE: 6/18/2024    INDICATION: cough  COMPARISON: None.      Impression    IMPRESSION: Heart size and pulmonary vascularity normal. Minimal scarring or atelectasis left base, lungs otherwise clear. Degenerative changes thoracic spine. Anterior wedging midthoracic vertebral body, presumed chronic. Gas distended bowel loops left   upper quadrant.   CT Head w/o Contrast    Narrative    EXAM: CT HEAD W/O CONTRAST  LOCATION: [facility]  DATE: 6/18/2024    INDICATION: Dizziness.  COMPARISON: None.  TECHNIQUE: Routine CT Head without IV contrast. Multiplanar reformats. Dose reduction techniques were used.    FINDINGS:  INTRACRANIAL CONTENTS: No intracranial hemorrhage, extraaxial collection, or mass effect.  No CT evidence of acute infarct. Mild presumed chronic small vessel ischemic changes. Mild generalized volume loss. No hydrocephalus.     VISUALIZED ORBITS/SINUSES/MASTOIDS: No intraorbital abnormality. Mild mucosal thickening scattered about the paranasal sinuses. No middle ear or mastoid effusion.    BONES/SOFT TISSUES: No acute abnormality.      Impression     IMPRESSION:  1.  No CT evidence for acute intracranial process.  2.  Mild chronic microvascular ischemic changes as above.   CT Abdomen Pelvis w Contrast    Narrative    EXAM: CT ABDOMEN PELVIS W CONTRAST  LOCATION: [facility]  DATE: 6/18/2024    INDICATION: leukocytosis, nausea, gas distendxed bowel loops on XR  COMPARISON: October 27, 2023.  TECHNIQUE: CT scan of the abdomen and pelvis was performed following injection of IV contrast. Multiplanar reformats were obtained. Dose reduction techniques were used.  CONTRAST: 135ml isovue 370    FINDINGS:   LOWER CHEST: Normal.    HEPATOBILIARY: Normal liver and gallbladder.    PANCREAS: Normal.    SPLEEN: Prior splenectomy with 35 mm nodule of regenerated splenic tissue in the left upper quadrant.    ADRENAL GLANDS: Normal.    KIDNEYS/BLADDER: Normal.    BOWEL: Prior appendectomy. Stomach, small bowel, and large bowel appear normal. There is normal gas in the stomach and in the splenic flexure of the colon, accounting for the air-filled loops of bowel demonstrated on chest x-ray this evening.    LYMPH NODES: Normal.    VASCULATURE: Scattered atheromatous vascular calcification with normal caliber aorta and normal enhancement of mesenteric vessels.    PELVIC ORGANS: Normal.    MUSCULOSKELETAL: Normal.      Impression    IMPRESSION:   1.  Prior splenectomy and appendectomy. No significant abnormality is present.

## 2024-06-19 NOTE — PROGRESS NOTES
6MS DISCHARGE    D: Patient discharged to home at 1730. Patient accompanied by wife.    I: Discharge prescriptions given to patient. All discharge medications and instructions reviewed with patient. Patient instructed to seek care if experiencing worsening symptoms, such as dizziness. Other phone numbers to call with questions or concerns after discharge reviewed. PIV removed. Education completed.    A: Patient verbalized understanding of discharge medications and instructions. Prescribed home medications given to patient.  Belongings returned to patient.    P: Patient to follow-up on 6/22/2024 with PMD.     Sylvia Mckeon RN on 6/19/2024 at 5:32 PM